# Patient Record
Sex: MALE | Race: WHITE | NOT HISPANIC OR LATINO | Employment: OTHER | URBAN - METROPOLITAN AREA
[De-identification: names, ages, dates, MRNs, and addresses within clinical notes are randomized per-mention and may not be internally consistent; named-entity substitution may affect disease eponyms.]

---

## 2019-02-06 ENCOUNTER — TELEPHONE (OUTPATIENT)
Dept: CARDIOLOGY | Facility: CLINIC | Age: 69
End: 2019-02-06

## 2019-02-06 NOTE — TELEPHONE ENCOUNTER
I called pt Re: NP OV -- 2/12/19- [facility] for him to call me back.     I faxed PCP for records.

## 2019-02-06 NOTE — TELEPHONE ENCOUNTER
Pt called me back- He states he gets SOB, chest tightness x few mos off and on. Pt did not go to ER.     Saw Jose 10/9/18 for physical- EKG was normal.    Family History-     Father heart attack- 78yo    Maternal Grandparents both had heart issues.

## 2019-02-06 NOTE — TELEPHONE ENCOUNTER
Dr. Mckeon- NP OV -- 2/12/19- [facility]    See Below.    Would you want testing with visit?    Let me know.    Thanks,    KL

## 2019-02-06 NOTE — TELEPHONE ENCOUNTER
"Information from Appt Notes- copied and pasted    \"Np self ref. for cardiac eval. Pt had ekg and labs done  w/ pcp Dr Arana/ In[facility]  P# [phone]  will fax to [id number]\"  "

## 2019-02-11 PROBLEM — I10 ESSENTIAL HYPERTENSION: Status: ACTIVE | Noted: 2019-02-11

## 2019-02-11 PROBLEM — R07.89 OTHER CHEST PAIN: Status: ACTIVE | Noted: 2019-02-11

## 2019-02-12 ENCOUNTER — OFFICE VISIT (OUTPATIENT)
Dept: CARDIOLOGY | Facility: CLINIC | Age: 69
End: 2019-02-12
Payer: MEDICARE

## 2019-02-12 ENCOUNTER — TELEPHONE (OUTPATIENT)
Dept: SCHEDULING | Facility: CLINIC | Age: 69
End: 2019-02-12

## 2019-02-12 VITALS
WEIGHT: 154 LBS | DIASTOLIC BLOOD PRESSURE: 80 MMHG | HEIGHT: 67 IN | OXYGEN SATURATION: 98 % | BODY MASS INDEX: 24.17 KG/M2 | SYSTOLIC BLOOD PRESSURE: 122 MMHG | HEART RATE: 94 BPM

## 2019-02-12 DIAGNOSIS — R07.89 OTHER CHEST PAIN: ICD-10-CM

## 2019-02-12 DIAGNOSIS — I10 ESSENTIAL HYPERTENSION: ICD-10-CM

## 2019-02-12 DIAGNOSIS — K76.0 FATTY LIVER: ICD-10-CM

## 2019-02-12 DIAGNOSIS — R07.9 CHEST PAIN, UNSPECIFIED TYPE: Primary | ICD-10-CM

## 2019-02-12 PROBLEM — Z82.49 FAMILY HISTORY OF EARLY CAD: Status: ACTIVE | Noted: 2019-02-12

## 2019-02-12 PROCEDURE — 93000 ELECTROCARDIOGRAM COMPLETE: CPT | Performed by: INTERNAL MEDICINE

## 2019-02-12 PROCEDURE — 99204 OFFICE O/P NEW MOD 45 MIN: CPT | Performed by: INTERNAL MEDICINE

## 2019-02-12 RX ORDER — NAPROXEN SODIUM 550 MG/1
550 TABLET ORAL AS NEEDED
Refills: 6 | COMMUNITY
Start: 2018-12-03 | End: 2020-08-14

## 2019-02-12 RX ORDER — AMLODIPINE BESYLATE 2.5 MG/1
2.5 TABLET ORAL DAILY
COMMUNITY

## 2019-02-12 RX ORDER — LORAZEPAM 0.5 MG/1
0.5 TABLET ORAL AS NEEDED
Refills: 0 | COMMUNITY
Start: 2019-01-23

## 2019-02-12 RX ORDER — MAGNESIUM 250 MG
250 TABLET ORAL DAILY
COMMUNITY
End: 2020-08-14

## 2019-02-12 RX ORDER — TAMSULOSIN HYDROCHLORIDE 0.4 MG/1
0.8 CAPSULE ORAL NIGHTLY
Refills: 3 | COMMUNITY
Start: 2018-12-01

## 2019-02-12 RX ORDER — OMEPRAZOLE 40 MG/1
40 CAPSULE, DELAYED RELEASE ORAL
Refills: 3 | COMMUNITY
Start: 2018-12-04

## 2019-02-12 RX ORDER — FINASTERIDE 5 MG/1
5 TABLET, FILM COATED ORAL
Refills: 4 | COMMUNITY
Start: 2018-12-04

## 2019-02-12 ASSESSMENT — ENCOUNTER SYMPTOMS
HEMATOLOGIC/LYMPHATIC NEGATIVE: 1
FOCAL WEAKNESS: 0
CLAUDICATION: 0
CONSTIPATION: 0
HOARSE VOICE: 0
SLEEP DISTURBANCES DUE TO BREATHING: 0
NUMBNESS: 0
NAUSEA: 0
HEADACHES: 0
NEAR-SYNCOPE: 0
ORTHOPNEA: 0
HEARTBURN: 0
JAUNDICE: 0
HEMOPTYSIS: 0
SPUTUM PRODUCTION: 0
WEIGHT GAIN: 0
SNORING: 0
COUGH: 0
MUSCLE CRAMPS: 0
DIZZINESS: 0
TREMORS: 0
PALPITATIONS: 0
WEIGHT LOSS: 0
DYSPNEA ON EXERTION: 0
FREQUENCY: 0
FALLS: 0
IRREGULAR HEARTBEAT: 0
WHEEZING: 0
DIARRHEA: 0
LIGHT-HEADEDNESS: 0
ABDOMINAL PAIN: 0
SYNCOPE: 0
ANOREXIA: 0
VERTIGO: 0
MEMORY LOSS: 0
NERVOUS/ANXIOUS: 0
CHANGE IN BOWEL HABIT: 0
MYALGIAS: 0
PND: 0
BRUISES/BLEEDS EASILY: 0
INSOMNIA: 0
SHORTNESS OF BREATH: 0

## 2019-02-12 NOTE — ASSESSMENT & PLAN NOTE
New onset of chest pain some is concerning for possible angina   Other aspects sound more atypical and can occur at rest   risk factors include family history and hypertension we will check cholesterol level  Plan coronary CT angiogram and stress echocardiogram  This will establish the presence or absence and degree of CAD

## 2019-02-12 NOTE — PROGRESS NOTES
Cardiology Consult/New Patient    Eliot Garcia MD          Jossue Freire is a 68 y.o. male identifies as who presents with   He is here for new onset of shortness of breath and chest pain some occurs with activity some not can last a few minutes  It is a little concerning  He gets it rarely  He said he was shoveling snow yesterday and had no issues  Risk factors include hypertension, family history he has never smoked he does not know his cholesterol level      Patient Active Problem List    Diagnosis Date Noted   • Fatty liver 02/12/2019   • Family history of early CAD 02/12/2019   • Essential hypertension 02/11/2019   • Other chest pain 02/11/2019       Medical History:   Past Medical History:   Diagnosis Date   • Arthritis    • Heart murmur    • Infectious viral hepatitis    • Pancreatitis        Surgical History:   Past Surgical History:   Procedure Laterality Date   • CHOLECYSTECTOMY     • EYE SURGERY     • SKIN BIOPSY         Allergies: Patient has no known allergies.    Current Outpatient Prescriptions   Medication Sig Dispense Refill   • amLODIPine (NORVASC) 2.5 mg tablet Take 2.5 mg by mouth daily.     • finasteride (PROSCAR) 5 mg tablet Take 5 mg by mouth once daily.  4   • folic acid/multivit-min/lutein (CENTRUM SILVER ORAL) Take by mouth.     • LORazepam (ATIVAN) 0.5 mg tablet Take 0.5 mg by mouth nightly.  0   • magnesium 250 mg tablet Take 250 mg by mouth daily. Patient takes 2  Tablets daily     • naproxen sodium (ANAPROX) 550 mg tablet Take 550 mg by mouth 2 (two) times a day.  6   • omeprazole (PriLOSEC) 40 mg capsule Take 40 mg by mouth once daily.  3   • tamsulosin (FLOMAX) 0.4 mg capsule Take 0.8 mg by mouth nightly.  3     No current facility-administered medications for this visit.        Social History:   Social History     Social History   • Marital status:      Spouse name: N/A   • Number of children: N/A   • Years of education: N/A     Social History Main Topics   • Smoking  status: Never Smoker   • Smokeless tobacco: Never Used   • Alcohol use No   • Drug use: Unknown   • Sexual activity: Defer     Other Topics Concern   • None     Social History Narrative   • None       Family History:   Family History   Problem Relation Age of Onset   • Heart attack Father    • No Known Problems Sister        Review of Systems   Review of Systems   Constitution: Negative for malaise/fatigue, weight gain and weight loss.   HENT: Negative for hearing loss and hoarse voice.    Eyes: Negative for visual disturbance.   Cardiovascular: Positive for chest pain. Negative for claudication, cyanosis, dyspnea on exertion, irregular heartbeat, leg swelling, near-syncope, orthopnea, palpitations, paroxysmal nocturnal dyspnea and syncope.   Respiratory: Negative for cough, hemoptysis, shortness of breath, sleep disturbances due to breathing, snoring, sputum production and wheezing.    Endocrine: Negative for cold intolerance and heat intolerance.   Hematologic/Lymphatic: Negative.  Negative for bleeding problem. Does not bruise/bleed easily.   Skin: Negative.  Negative for rash.   Musculoskeletal: Negative for arthritis, falls, joint pain, muscle cramps and myalgias.   Gastrointestinal: Negative for abdominal pain, anorexia, change in bowel habit, constipation, diarrhea, dysphagia, heartburn, jaundice and nausea.   Genitourinary: Negative for frequency and nocturia.   Neurological: Negative for dizziness, focal weakness, headaches, light-headedness, numbness, tremors and vertigo.   Psychiatric/Behavioral: Negative for memory loss. The patient does not have insomnia and is not nervous/anxious.    Allergic/Immunologic: Negative for hives.       Objective       Vitals:    02/12/19 1055   BP: 122/80   Pulse: 94   SpO2: 98%       Physical Exam   Constitutional: He is oriented to person, place, and time. He appears well-developed and well-nourished. He is active.  Non-toxic appearance. He does not have a sickly  appearance. He does not appear ill. No distress. He is not intubated.   HENT:   Head: Normocephalic. Not microcephalic. Head is without raccoon's eyes, without abrasion and without contusion.   Nose: Nose normal.   Eyes: EOM are normal. Left eye exhibits no discharge and no exudate. Right conjunctiva is not injected. Left conjunctiva is not injected. Left conjunctiva has no hemorrhage. No scleral icterus. Pupils are equal.   Neck: Normal range of motion. Neck supple. Normal carotid pulses and no hepatojugular reflux present. Carotid bruit is not present.   Cardiovascular: Normal rate, regular rhythm, normal heart sounds, intact distal pulses and normal pulses.  PMI is not displaced.  Exam reveals no gallop and no friction rub.    No murmur heard.  Pulmonary/Chest: Effort normal and breath sounds normal. No stridor. No apnea, no tachypnea and no bradypnea. He is not intubated. No respiratory distress. He has no wheezes. He has no rales. He exhibits no tenderness.   Abdominal: Soft. Normal appearance, normal aorta and bowel sounds are normal. He exhibits no distension. There is no tenderness.   Musculoskeletal: Normal range of motion. He exhibits no edema or deformity.   Neurological: He is alert and oriented to person, place, and time.   Skin: No abrasion, no bruising, no ecchymosis and no rash noted. He is not diaphoretic. No erythema. No pallor.   Psychiatric: He has a normal mood and affect. His mood appears not anxious. His affect is not angry, not blunt, not labile and not inappropriate. His speech is not slurred. He is not agitated, not aggressive, not withdrawn and not combative. He does not exhibit a depressed mood. He is communicative.        Labs   No results found for: WBC, HGB, HCT, PLT, CHOL, TRIG, HDL, LDLDIRECT, ALT, AST, NA, K, CL, CREATININE, BUN, CO2, TSH, PSA, INR, HGBA1C, MICROALBUR    Imaging      ECG nsr irbbb        Assessment/Plan     Other chest pain  New onset of chest pain some is  concerning for possible angina   Other aspects sound more atypical and can occur at rest   risk factors include family history and hypertension we will check cholesterol level  Plan coronary CT angiogram and stress echocardiogram  This will establish the presence or absence and degree of CAD    Essential hypertension  Controlled with amlodipine       Plan coronary CT angiogram and stress echo to establish presence or absence of CAD certainly if there is any arterial sclerosis would begin statin therapy to push LDL cholesterol under 70  He will get lab work prior to the angiogram  And I will see him at time of stress testing    Knows of any increase in frequency or prolonged episode to go to the emergency room  This letter was generated using speech recognition software.  Please excuse any typographical errors.    Liam Mckeon MD  2/12/2019

## 2019-02-12 NOTE — LETTER
February 12, 2019     Eliot Garcia MD  [address]   [address]  [place] [address]    Patient: Jossue Freire   YOB: 1950   Date of Visit: 2/12/2019       Dear Ricardo    Thank you for referring Jossue Freire to me for evaluation. Below are my notes for this consultation.    If you have questions, please do not hesitate to call me. I look forward to following your patient along with you.         Sincerely,        Liam Mckeon MD        CC: No Recipients  Liam Mckeon MD  2/12/2019 11:18 AM  Sign at close encounter  Cardiology Consult/New Patient    Eliot Garcia MD          Jossue Freire is a 68 y.o. male identifies as who presents with   He is here for new onset of shortness of breath and chest pain some occurs with activity some not can last a few minutes  It is a little concerning  He gets it rarely  He said he was shoveling snow yesterday and had no issues  Risk factors include hypertension, family history he has never smoked he does not know his cholesterol level      Patient Active Problem List    Diagnosis Date Noted   • Fatty liver 02/12/2019   • Family history of early CAD 02/12/2019   • Essential hypertension 02/11/2019   • Other chest pain 02/11/2019       Medical History:   Past Medical History:   Diagnosis Date   • Arthritis    • Heart murmur    • Infectious viral hepatitis    • Pancreatitis        Surgical History:   Past Surgical History:   Procedure Laterality Date   • CHOLECYSTECTOMY     • EYE SURGERY     • SKIN BIOPSY         Allergies: Patient has no known allergies.    Current Outpatient Prescriptions   Medication Sig Dispense Refill   • amLODIPine (NORVASC) 2.5 mg tablet Take 2.5 mg by mouth daily.     • finasteride (PROSCAR) 5 mg tablet Take 5 mg by mouth once daily.  4   • folic acid/multivit-min/lutein (CENTRUM SILVER ORAL) Take by mouth.     • LORazepam (ATIVAN) 0.5 mg tablet Take 0.5 mg by mouth nightly.  0   • magnesium 250 mg tablet Take 250  mg by mouth daily. Patient takes 2  Tablets daily     • naproxen sodium (ANAPROX) 550 mg tablet Take 550 mg by mouth 2 (two) times a day.  6   • omeprazole (PriLOSEC) 40 mg capsule Take 40 mg by mouth once daily.  3   • tamsulosin (FLOMAX) 0.4 mg capsule Take 0.8 mg by mouth nightly.  3     No current facility-administered medications for this visit.        Social History:   Social History     Social History   • Marital status:      Spouse name: N/A   • Number of children: N/A   • Years of education: N/A     Social History Main Topics   • Smoking status: Never Smoker   • Smokeless tobacco: Never Used   • Alcohol use No   • Drug use: Unknown   • Sexual activity: Defer     Other Topics Concern   • None     Social History Narrative   • None       Family History:   Family History   Problem Relation Age of Onset   • Heart attack Father    • No Known Problems Sister        Review of Systems   Review of Systems   Constitution: Negative for malaise/fatigue, weight gain and weight loss.   HENT: Negative for hearing loss and hoarse voice.    Eyes: Negative for visual disturbance.   Cardiovascular: Positive for chest pain. Negative for claudication, cyanosis, dyspnea on exertion, irregular heartbeat, leg swelling, near-syncope, orthopnea, palpitations, paroxysmal nocturnal dyspnea and syncope.   Respiratory: Negative for cough, hemoptysis, shortness of breath, sleep disturbances due to breathing, snoring, sputum production and wheezing.    Endocrine: Negative for cold intolerance and heat intolerance.   Hematologic/Lymphatic: Negative.  Negative for bleeding problem. Does not bruise/bleed easily.   Skin: Negative.  Negative for rash.   Musculoskeletal: Negative for arthritis, falls, joint pain, muscle cramps and myalgias.   Gastrointestinal: Negative for abdominal pain, anorexia, change in bowel habit, constipation, diarrhea, dysphagia, heartburn, jaundice and nausea.   Genitourinary: Negative for frequency and  nocturia.   Neurological: Negative for dizziness, focal weakness, headaches, light-headedness, numbness, tremors and vertigo.   Psychiatric/Behavioral: Negative for memory loss. The patient does not have insomnia and is not nervous/anxious.    Allergic/Immunologic: Negative for hives.       Objective       Vitals:    02/12/19 1055   BP: 122/80   Pulse: 94   SpO2: 98%       Physical Exam   Constitutional: He is oriented to person, place, and time. He appears well-developed and well-nourished. He is active.  Non-toxic appearance. He does not have a sickly appearance. He does not appear ill. No distress. He is not intubated.   HENT:   Head: Normocephalic. Not microcephalic. Head is without raccoon's eyes, without abrasion and without contusion.   Nose: Nose normal.   Eyes: EOM are normal. Left eye exhibits no discharge and no exudate. Right conjunctiva is not injected. Left conjunctiva is not injected. Left conjunctiva has no hemorrhage. No scleral icterus. Pupils are equal.   Neck: Normal range of motion. Neck supple. Normal carotid pulses and no hepatojugular reflux present. Carotid bruit is not present.   Cardiovascular: Normal rate, regular rhythm, normal heart sounds, intact distal pulses and normal pulses.  PMI is not displaced.  Exam reveals no gallop and no friction rub.    No murmur heard.  Pulmonary/Chest: Effort normal and breath sounds normal. No stridor. No apnea, no tachypnea and no bradypnea. He is not intubated. No respiratory distress. He has no wheezes. He has no rales. He exhibits no tenderness.   Abdominal: Soft. Normal appearance, normal aorta and bowel sounds are normal. He exhibits no distension. There is no tenderness.   Musculoskeletal: Normal range of motion. He exhibits no edema or deformity.   Neurological: He is alert and oriented to person, place, and time.   Skin: No abrasion, no bruising, no ecchymosis and no rash noted. He is not diaphoretic. No erythema. No pallor.   Psychiatric: He  has a normal mood and affect. His mood appears not anxious. His affect is not angry, not blunt, not labile and not inappropriate. His speech is not slurred. He is not agitated, not aggressive, not withdrawn and not combative. He does not exhibit a depressed mood. He is communicative.        Labs   No results found for: WBC, HGB, HCT, PLT, CHOL, TRIG, HDL, LDLDIRECT, ALT, AST, NA, K, CL, CREATININE, BUN, CO2, TSH, PSA, INR, HGBA1C, MICROALBUR    Imaging      ECG nsr irbbb        Assessment/Plan     Other chest pain  New onset of chest pain some is concerning for possible angina   Other aspects sound more atypical and can occur at rest   risk factors include family history and hypertension we will check cholesterol level  Plan coronary CT angiogram and stress echocardiogram  This will establish the presence or absence and degree of CAD    Essential hypertension  Controlled with amlodipine       Plan coronary CT angiogram and stress echo to establish presence or absence of CAD certainly if there is any arterial sclerosis would begin statin therapy to push LDL cholesterol under 70  He will get lab work prior to the angiogram  And I will see him at time of stress testing      This letter was generated using speech recognition software.  Please excuse any typographical errors.    Liam Mckeon MD  2/12/2019

## 2019-02-12 NOTE — TELEPHONE ENCOUNTER
Dr Mckeon pat. He scheduled CT angiogram 2/27.  Patient states he will need another study after this one. Please call patient at [phone] to schedule.

## 2019-02-25 NOTE — PROGRESS NOTES
Cardiology Note    Eliot Garcia MD          Jossue Freire is a 68 y.o. male 1950     He returns for follow-up visit and stress echocardiogram\  He has nonobstructive CAD based on recent coronary CT angiogram 30-40% lesions and coronary calcium score of 38  He has episodes of chest pain some sounds like angina but some occurs at rest and sounds more atypical  Risk factors include family history hypertension and hyperlipidemia  Stress echocardiogram today negative for ischemia                 Patient Active Problem List    Diagnosis Date Noted   • Coronary artery disease involving native coronary artery of native heart without angina pectoris 03/01/2019   • Fatty liver 02/12/2019   • Family history of early CAD 02/12/2019   • Essential hypertension 02/11/2019   • Other chest pain 02/11/2019       Allergy  Patient has no known allergies.    MED LIST     Current Outpatient Prescriptions   Medication Sig Dispense Refill   • amLODIPine (NORVASC) 2.5 mg tablet Take 2.5 mg by mouth daily.     • diazePAM (VALIUM) 2 mg tablet Take 2 mg by mouth nightly.     • finasteride (PROSCAR) 5 mg tablet Take 5 mg by mouth once daily.  4   • folic acid/multivit-min/lutein (CENTRUM SILVER ORAL) Take by mouth.     • LORazepam (ATIVAN) 0.5 mg tablet Take 0.5 mg by mouth as needed.    0   • magnesium 250 mg tablet Take 250 mg by mouth daily. Patient takes 2  Tablets daily     • naproxen sodium (ANAPROX) 550 mg tablet Take 550 mg by mouth as needed.    6   • omeprazole (PriLOSEC) 40 mg capsule Take 40 mg by mouth once daily.  3   • tamsulosin (FLOMAX) 0.4 mg capsule Take 0.8 mg by mouth nightly.  3   • aspirin (ASPIR-81) 81 mg enteric coated tablet Take 1 tablet (81 mg total) by mouth daily. 90 tablet 1   • rosuvastatin (CRESTOR) 20 mg tablet Take 1 tablet (20 mg total) by mouth daily. 90 tablet 1     No current facility-administered medications for this visit.         Review of Systems   Constitution: Negative for  "malaise/fatigue, weight gain and weight loss.   HENT: Negative for hearing loss and hoarse voice.    Eyes: Negative for visual disturbance.   Cardiovascular: Negative for chest pain, claudication, cyanosis, dyspnea on exertion, irregular heartbeat, leg swelling, near-syncope, orthopnea, palpitations, paroxysmal nocturnal dyspnea and syncope.   Respiratory: Negative for cough, hemoptysis, shortness of breath, sleep disturbances due to breathing, snoring, sputum production and wheezing.    Endocrine: Negative for cold intolerance and heat intolerance.   Hematologic/Lymphatic: Negative.  Negative for bleeding problem. Does not bruise/bleed easily.   Skin: Negative.  Negative for rash.   Musculoskeletal: Negative for arthritis, falls, joint pain, muscle cramps and myalgias.   Gastrointestinal: Negative for abdominal pain, anorexia, change in bowel habit, constipation, diarrhea, dysphagia, heartburn, jaundice and nausea.   Genitourinary: Negative for frequency and nocturia.   Neurological: Negative for dizziness, focal weakness, headaches, light-headedness, numbness, tremors and vertigo.   Psychiatric/Behavioral: Negative for memory loss. The patient does not have insomnia and is not nervous/anxious.    Allergic/Immunologic: Negative for hives.       Labs                 No results found for: WBC, HGB, HCT, PLT, CHOL, TRIG, HDL, LDLDIRECT, TOTLDLC, LDLCALC, ALT, AST, NA, K, CL, CREATININE, BUN, CO2, TSH, INR, HGBA1C, BNP    No results found for: GLUCOSE, CALCIUM, NA, K, CO2, CL, BUN, CREATININE    Objective   Vitals:    03/04/19 1522   BP: (!) 156/80   BP Location: Right upper arm   Patient Position: Standing   Pulse: 93   SpO2: 98%   Weight: 71.2 kg (157 lb)   Height: 1.702 m (5' 7\")     Physical Exam   Constitutional: He is oriented to person, place, and time. He appears well-developed and well-nourished. He is active.  Non-toxic appearance. He does not have a sickly appearance. He does not appear ill. No distress. He " is not intubated.   HENT:   Head: Normocephalic. Not microcephalic. Head is without raccoon's eyes, without abrasion and without contusion.   Nose: Nose normal.   Eyes: EOM are normal. Left eye exhibits no discharge and no exudate. Right conjunctiva is not injected. Left conjunctiva is not injected. Left conjunctiva has no hemorrhage. No scleral icterus. Pupils are equal.   Neck: Normal range of motion. Neck supple. Normal carotid pulses and no hepatojugular reflux present. Carotid bruit is not present.   Cardiovascular: Normal rate, regular rhythm, normal heart sounds, intact distal pulses and normal pulses.  PMI is not displaced.  Exam reveals no gallop and no friction rub.    No murmur heard.  Pulmonary/Chest: Effort normal and breath sounds normal. No stridor. No apnea, no tachypnea and no bradypnea. He is not intubated. No respiratory distress. He has no wheezes. He has no rales. He exhibits no tenderness.   Abdominal: Soft. Normal appearance, normal aorta and bowel sounds are normal. He exhibits no distension. There is no tenderness.   Musculoskeletal: Normal range of motion. He exhibits no edema or deformity.   Neurological: He is alert and oriented to person, place, and time.   Skin: No abrasion, no bruising, no ecchymosis and no rash noted. He is not diaphoretic. No erythema. No pallor.   Psychiatric: He has a normal mood and affect. His mood appears not anxious. His affect is not angry, not blunt, not labile and not inappropriate. His speech is not slurred. He is not agitated, not aggressive, not withdrawn and not combative. He does not exhibit a depressed mood. He is communicative.       Cardiac Procedures    STRESS TEST  Stress echo neg 2/19    CAT SCAN  COR CTA SCORE 38 LAD [id number]% MID MID RCA 30%   FATTY LIVER     SUMMARY:COR CTA 2/19  1. Nonobstructive two-vessel coronary artery disease. There is focal calcified  plaque involving the proximal and mid LAD with less than 30% stenosis. There is  focal  calcified plaque in the mid RCA with less than 30% stenosis.  2.  Normal cardiac structures.  3.  Normal left ventricular systolic function. Left ventricular ejection  fraction 50%.  4.  Small hiatal hernia.  5.  Coronary calcium score 38.  This places the patient in the GREEN 80th risk  percentile for age and gender matched individuals  EKG    Assessment/Plan:  Coronary artery disease involving native coronary artery of native heart without angina pectoris  He has nonobstructive CAD based on coronary CT angiogram performed February 2019 30% lesions calcium score of 38 stress echo today negative for ischemia  Baseline EKG is fluctuating inferior T wave inversions  For secondary prevention will begin statin for his LDL cholesterol 90 many people believe with mild plaquing/7 LDL 50 if possible and he is on aspirin 81 mg recheck lab work in a few months    Essential hypertension  Physiologic response to stress testing daily continue amlodipine       He has a diagnosis of diagnosis of nonobstructive CAD made by coronary CT angiogram and stress echocardiogram  Family history of premature cardiac disease  Will begin statin therapy antiplatelet therapy  Blood pressure is well controlled on amlodipine follow-up with lab work in 3      Thank you for allowing me to participate in the care of this patient.  I hope this information is helpful.    Liam Mckeon MD [facility]   3/4/2019  Cc Eliot Garcia MD

## 2019-02-27 ENCOUNTER — HOSPITAL ENCOUNTER (OUTPATIENT)
Dept: RADIOLOGY | Facility: HOSPITAL | Age: 69
Discharge: HOME | End: 2019-02-27
Attending: INTERNAL MEDICINE
Payer: MEDICARE

## 2019-02-27 VITALS
BODY MASS INDEX: 24.17 KG/M2 | WEIGHT: 154 LBS | DIASTOLIC BLOOD PRESSURE: 71 MMHG | HEIGHT: 67 IN | OXYGEN SATURATION: 99 % | HEART RATE: 62 BPM | SYSTOLIC BLOOD PRESSURE: 129 MMHG

## 2019-02-27 DIAGNOSIS — R07.9 CHEST PAIN, UNSPECIFIED TYPE: ICD-10-CM

## 2019-02-27 PROCEDURE — 75574 CT ANGIO HRT W/3D IMAGE: CPT

## 2019-02-27 PROCEDURE — 63700000 HC SELF-ADMINISTRABLE DRUG: Performed by: INTERNAL MEDICINE

## 2019-02-27 PROCEDURE — 63600105 HC IODINE BASED CONTRAST: Mod: JW | Performed by: INTERNAL MEDICINE

## 2019-02-27 PROCEDURE — 25000000 HC PHARMACY GENERAL: Performed by: INTERNAL MEDICINE

## 2019-02-27 RX ORDER — METOPROLOL TARTRATE 1 MG/ML
5 INJECTION, SOLUTION INTRAVENOUS AS NEEDED
Status: COMPLETED | OUTPATIENT
Start: 2019-02-27 | End: 2019-02-27

## 2019-02-27 RX ORDER — NITROGLYCERIN 0.4 MG/1
0.4 TABLET SUBLINGUAL ONCE
Status: COMPLETED | OUTPATIENT
Start: 2019-02-27 | End: 2019-02-27

## 2019-02-27 RX ORDER — METOPROLOL TARTRATE 50 MG/1
150 TABLET ORAL ONCE
Status: COMPLETED | OUTPATIENT
Start: 2019-02-27 | End: 2019-02-27

## 2019-02-27 RX ORDER — METOPROLOL TARTRATE 50 MG/1
50 TABLET ORAL ONCE
Status: COMPLETED | OUTPATIENT
Start: 2019-02-27 | End: 2019-02-27

## 2019-02-27 RX ADMIN — METOPROLOL TARTRATE 5 MG: 5 INJECTION, SOLUTION INTRAVENOUS at 11:29

## 2019-02-27 RX ADMIN — METOPROLOL TARTRATE 5 MG: 5 INJECTION, SOLUTION INTRAVENOUS at 11:05

## 2019-02-27 RX ADMIN — METOPROLOL TARTRATE 150 MG: 50 TABLET, FILM COATED ORAL at 10:03

## 2019-02-27 RX ADMIN — NITROGLYCERIN 0.4 MG: 0.4 TABLET SUBLINGUAL at 11:49

## 2019-02-27 RX ADMIN — METOPROLOL TARTRATE 50 MG: 50 TABLET, FILM COATED ORAL at 10:26

## 2019-02-27 RX ADMIN — IOHEXOL 95 ML: 350 INJECTION, SOLUTION INTRAVENOUS at 12:17

## 2019-02-27 RX ADMIN — METOPROLOL TARTRATE 5 MG: 5 INJECTION, SOLUTION INTRAVENOUS at 11:24

## 2019-02-27 RX ADMIN — METOPROLOL TARTRATE 5 MG: 5 INJECTION, SOLUTION INTRAVENOUS at 11:10

## 2019-02-27 RX ADMIN — METOPROLOL TARTRATE 5 MG: 5 INJECTION, SOLUTION INTRAVENOUS at 11:35

## 2019-02-27 RX ADMIN — METOPROLOL TARTRATE 5 MG: 5 INJECTION, SOLUTION INTRAVENOUS at 11:00

## 2019-02-28 ENCOUNTER — TELEPHONE (OUTPATIENT)
Dept: CARDIOLOGY | Facility: CLINIC | Age: 69
End: 2019-02-28

## 2019-03-01 PROBLEM — I25.10 CORONARY ARTERY DISEASE INVOLVING NATIVE CORONARY ARTERY OF NATIVE HEART WITHOUT ANGINA PECTORIS: Status: ACTIVE | Noted: 2019-03-01

## 2019-03-04 ENCOUNTER — HOSPITAL ENCOUNTER (OUTPATIENT)
Dept: CARDIOLOGY | Facility: CLINIC | Age: 69
Discharge: HOME | End: 2019-03-04
Payer: MEDICARE

## 2019-03-04 ENCOUNTER — OFFICE VISIT (OUTPATIENT)
Dept: CARDIOLOGY | Facility: CLINIC | Age: 69
End: 2019-03-04
Payer: MEDICARE

## 2019-03-04 VITALS — HEIGHT: 67 IN | WEIGHT: 154 LBS | BODY MASS INDEX: 24.17 KG/M2

## 2019-03-04 VITALS
WEIGHT: 157 LBS | HEIGHT: 67 IN | BODY MASS INDEX: 24.64 KG/M2 | OXYGEN SATURATION: 98 % | HEART RATE: 93 BPM | DIASTOLIC BLOOD PRESSURE: 80 MMHG | SYSTOLIC BLOOD PRESSURE: 156 MMHG

## 2019-03-04 DIAGNOSIS — I10 ESSENTIAL HYPERTENSION: ICD-10-CM

## 2019-03-04 DIAGNOSIS — K76.0 FATTY LIVER: ICD-10-CM

## 2019-03-04 DIAGNOSIS — R07.89 OTHER CHEST PAIN: ICD-10-CM

## 2019-03-04 DIAGNOSIS — R07.89 OTHER CHEST PAIN: Primary | ICD-10-CM

## 2019-03-04 DIAGNOSIS — Z82.49 FAMILY HISTORY OF EARLY CAD: ICD-10-CM

## 2019-03-04 DIAGNOSIS — R07.9 CHEST PAIN, UNSPECIFIED TYPE: ICD-10-CM

## 2019-03-04 LAB
AORTIC ROOT ANNULUS: 3.5 CM
ASCENDING AORTA: 3.3 CM
AV PEAK GRADIENT: 5 MMHG
AV PEAK VELOCITY-S: 1.17 M/S
AV VALVE AREA: 2.58 CM2
BSA FOR ECHO PROCEDURE: 1.82 M2
E WAVE DECELERATION TIME: 197 MS
E/A RATIO: 0.7
E/E' RATIO: 10.6
E/LAT E' RATIO: 8.6
EDV (BP): 70.4 CM3
EF (A4C): 65.1 %
EF A2C: 57.3 %
EJECTION FRACTION: 60.4 %
ESV (BP): 27.9 CM3
LA ESV (BP): 45.4 CM3
LA ESV INDEX (A2C): 26.48 CM3/M2
LA ESV INDEX (BP): 24.95 CM3/M2
LA/AORTA RATIO: 1.2
LAAS-AP2: 16.4 CM2
LAAS-AP4: 15.7 CM2
LAD 2D: 4.2 CM
LALD A4C: 4.53 CM
LALD A4C: 4.9 CM
LAV-S: 48.2 CM3
LEFT ATRIUM VOLUME INDEX: 24.73 CM3/M2
LEFT ATRIUM VOLUME: 45 CM3
LEFT VENTRICLE DIASTOLIC VOLUME INDEX: 42.2 CM3/M2
LEFT VENTRICLE DIASTOLIC VOLUME: 76.8 CM3
LEFT VENTRICLE SYSTOLIC VOLUME INDEX: 14.73 CM3/M2
LEFT VENTRICLE SYSTOLIC VOLUME: 26.8 CM3
LV DIASTOLIC VOLUME: 63.2 CM3
LV ESV (APICAL 2 CHAMBER): 26.9 CM3
LVAD-AP2: 24.3 CM2
LVAD-AP4: 26.8 CM2
LVAS-AP2: 14.4 CM2
LVAS-AP4: 13.4 CM2
LVEDVI(A2C): 34.73 CM3/M2
LVEDVI(BP): 38.68 CM3/M2
LVESVI(A2C): 14.78 CM3/M2
LVESVI(BP): 15.33 CM3/M2
LVLD-AP2: 7.9 CM
LVLD-AP4: 7.66 CM
LVLS-AP2: 6.63 CM
LVLS-AP4: 6.12 CM
LVOT 2D: 2 CM
LVOT A: 3.14 CM2
LVOT PEAK VELOCITY: 0.96 M/S
MV E'TISSUE VEL-LAT: 0.07 M/S
MV E'TISSUE VEL-MED: 0.06 M/S
MV PEAK A VEL: 0.88 M/S
MV PEAK E VEL: 0.59 M/S
PV PEAK GRADIENT: 4 MMHG
PV PV: 0.94 M/S
RVOT VMAX: 0.87 M/S
STRESS ANGINA INDEX: 0
STRESS BASELINE BP: NORMAL MMHG
STRESS BASELINE HR: 95 BPM
STRESS O2 SAT REST: 98 %
STRESS PERCENT HR: 91 %
STRESS POST ESTIMATED WORKLOAD: 10 METS
STRESS POST EXERCISE DUR MIN: 6 MIN
STRESS POST EXERCISE DUR SEC: 10 SEC
STRESS POST PEAK BP: NORMAL MMHG
STRESS POST PEAK HR: 139 BPM
STRESS TARGET HR: 130 BPM
TR MAX PG: 22 MMHG
TRICUSPID VALVE PEAK REGURGITATION VELOCITY: 2.35 M/S
TV REST PULMONARY ARTERY PRESSURE: 27 MMHG

## 2019-03-04 PROCEDURE — 93350 STRESS TTE ONLY: CPT | Performed by: INTERNAL MEDICINE

## 2019-03-04 PROCEDURE — 99214 OFFICE O/P EST MOD 30 MIN: CPT | Performed by: INTERNAL MEDICINE

## 2019-03-04 RX ORDER — ROSUVASTATIN CALCIUM 20 MG/1
20 TABLET, COATED ORAL DAILY
Qty: 90 TABLET | Refills: 1 | Status: SHIPPED | OUTPATIENT
Start: 2019-03-04 | End: 2019-08-29 | Stop reason: SDUPTHER

## 2019-03-04 RX ORDER — DIAZEPAM 2 MG/1
2 TABLET ORAL NIGHTLY
COMMUNITY
End: 2021-05-11

## 2019-03-04 RX ORDER — ASPIRIN 81 MG/1
81 TABLET ORAL DAILY
Qty: 90 TABLET | Refills: 1 | Status: SHIPPED | OUTPATIENT
Start: 2019-03-04 | End: 2019-08-29 | Stop reason: SDUPTHER

## 2019-03-04 ASSESSMENT — ENCOUNTER SYMPTOMS
CLAUDICATION: 0
FOCAL WEAKNESS: 0
MUSCLE CRAMPS: 0
NUMBNESS: 0
SYNCOPE: 0
HEARTBURN: 0
IRREGULAR HEARTBEAT: 0
NERVOUS/ANXIOUS: 0
ORTHOPNEA: 0
INSOMNIA: 0
DIARRHEA: 0
ABDOMINAL PAIN: 0
MEMORY LOSS: 0
LIGHT-HEADEDNESS: 0
SNORING: 0
WHEEZING: 0
JAUNDICE: 0
WEIGHT GAIN: 0
SHORTNESS OF BREATH: 0
DYSPNEA ON EXERTION: 0
TREMORS: 0
VERTIGO: 0
DIZZINESS: 0
HOARSE VOICE: 0
PND: 0
NEAR-SYNCOPE: 0
WEIGHT LOSS: 0
NAUSEA: 0
CONSTIPATION: 0
CHANGE IN BOWEL HABIT: 0
MYALGIAS: 0
FALLS: 0
COUGH: 0
HEADACHES: 0
BRUISES/BLEEDS EASILY: 0
SPUTUM PRODUCTION: 0
SLEEP DISTURBANCES DUE TO BREATHING: 0
HEMATOLOGIC/LYMPHATIC NEGATIVE: 1
FREQUENCY: 0
PALPITATIONS: 0
ANOREXIA: 0
HEMOPTYSIS: 0

## 2019-03-04 NOTE — ASSESSMENT & PLAN NOTE
He has nonobstructive CAD based on coronary CT angiogram performed February 2019 30% lesions calcium score of 38 stress echo today negative for ischemia  Baseline EKG is fluctuating inferior T wave inversions  For secondary prevention will begin statin for his LDL cholesterol 90 many people believe with mild plaquing/7 LDL 50 if possible and he is on aspirin 81 mg recheck lab work in a few months

## 2019-03-04 NOTE — LETTER
March 4, 2019     Eliot Garcia MD  [address]   [address]  [facility] [address]    Patient: Jossue Freire   YOB: 1950   Date of Visit: 3/4/2019       Dear Ricardo:    Thank you for referring Jossue Freire to me for evaluation. Below are my notes for this consultation.    If you have questions, please do not hesitate to call me. I look forward to following your patient along with you.         Sincerely,        Liam Mckeon MD        CC: No Recipients  Liam Mckeon MD  3/4/2019  3:34 PM  Sign at close encounter      Cardiology Note    Eliot Garcia MD          Jossue Freire is a 68 y.o. male 1950     He returns for follow-up visit and stress echocardiogram\has nonobstructive CAD based on recent coronary CT angiogram 30-40% lesions and coronary calcium score of 38  He has episodes of chest pain some sounds like angina but some occurs at rest and sounds more atypical  Risk factors include family history hypertension and hyperlipidemia  Stress echocardiogram today negative for ischemia                 Patient Active Problem List    Diagnosis Date Noted   • Coronary artery disease involving native coronary artery of native heart without angina pectoris 03/01/2019   • Fatty liver 02/12/2019   • Family history of early CAD 02/12/2019   • Essential hypertension 02/11/2019   • Other chest pain 02/11/2019       Allergy  Patient has no known allergies.    MED LIST     Current Outpatient Prescriptions   Medication Sig Dispense Refill   • amLODIPine (NORVASC) 2.5 mg tablet Take 2.5 mg by mouth daily.     • diazePAM (VALIUM) 2 mg tablet Take 2 mg by mouth nightly.     • finasteride (PROSCAR) 5 mg tablet Take 5 mg by mouth once daily.  4   • folic acid/multivit-min/lutein (CENTRUM SILVER ORAL) Take by mouth.     • LORazepam (ATIVAN) 0.5 mg tablet Take 0.5 mg by mouth as needed.    0   • magnesium 250 mg tablet Take 250 mg by mouth daily. Patient takes 2  Tablets daily     •  naproxen sodium (ANAPROX) 550 mg tablet Take 550 mg by mouth as needed.    6   • omeprazole (PriLOSEC) 40 mg capsule Take 40 mg by mouth once daily.  3   • tamsulosin (FLOMAX) 0.4 mg capsule Take 0.8 mg by mouth nightly.  3   • aspirin (ASPIR-81) 81 mg enteric coated tablet Take 1 tablet (81 mg total) by mouth daily. 90 tablet 1   • rosuvastatin (CRESTOR) 20 mg tablet Take 1 tablet (20 mg total) by mouth daily. 90 tablet 1     No current facility-administered medications for this visit.         Review of Systems   Constitution: Negative for malaise/fatigue, weight gain and weight loss.   HENT: Negative for hearing loss and hoarse voice.    Eyes: Negative for visual disturbance.   Cardiovascular: Negative for chest pain, claudication, cyanosis, dyspnea on exertion, irregular heartbeat, leg swelling, near-syncope, orthopnea, palpitations, paroxysmal nocturnal dyspnea and syncope.   Respiratory: Negative for cough, hemoptysis, shortness of breath, sleep disturbances due to breathing, snoring, sputum production and wheezing.    Endocrine: Negative for cold intolerance and heat intolerance.   Hematologic/Lymphatic: Negative.  Negative for bleeding problem. Does not bruise/bleed easily.   Skin: Negative.  Negative for rash.   Musculoskeletal: Negative for arthritis, falls, joint pain, muscle cramps and myalgias.   Gastrointestinal: Negative for abdominal pain, anorexia, change in bowel habit, constipation, diarrhea, dysphagia, heartburn, jaundice and nausea.   Genitourinary: Negative for frequency and nocturia.   Neurological: Negative for dizziness, focal weakness, headaches, light-headedness, numbness, tremors and vertigo.   Psychiatric/Behavioral: Negative for memory loss. The patient does not have insomnia and is not nervous/anxious.    Allergic/Immunologic: Negative for hives.       Labs                 No results found for: WBC, HGB, HCT, PLT, CHOL, TRIG, HDL, LDLDIRECT, TOTLDLC, LDLCALC, ALT, AST, NA, K, CL,  "CREATININE, BUN, CO2, TSH, INR, HGBA1C, BNP    No results found for: GLUCOSE, CALCIUM, NA, K, CO2, CL, BUN, CREATININE    Objective   Vitals:    03/04/19 1522   BP: (!) 156/80   BP Location: Right upper arm   Patient Position: Standing   Pulse: 93   SpO2: 98%   Weight: 71.2 kg (157 lb)   Height: 1.702 m (5' 7\")     Physical Exam   Constitutional: He is oriented to person, place, and time. He appears well-developed and well-nourished. He is active.  Non-toxic appearance. He does not have a sickly appearance. He does not appear ill. No distress. He is not intubated.   HENT:   Head: Normocephalic. Not microcephalic. Head is without raccoon's eyes, without abrasion and without contusion.   Nose: Nose normal.   Eyes: EOM are normal. Left eye exhibits no discharge and no exudate. Right conjunctiva is not injected. Left conjunctiva is not injected. Left conjunctiva has no hemorrhage. No scleral icterus. Pupils are equal.   Neck: Normal range of motion. Neck supple. Normal carotid pulses and no hepatojugular reflux present. Carotid bruit is not present.   Cardiovascular: Normal rate, regular rhythm, normal heart sounds, intact distal pulses and normal pulses.  PMI is not displaced.  Exam reveals no gallop and no friction rub.    No murmur heard.  Pulmonary/Chest: Effort normal and breath sounds normal. No stridor. No apnea, no tachypnea and no bradypnea. He is not intubated. No respiratory distress. He has no wheezes. He has no rales. He exhibits no tenderness.   Abdominal: Soft. Normal appearance, normal aorta and bowel sounds are normal. He exhibits no distension. There is no tenderness.   Musculoskeletal: Normal range of motion. He exhibits no edema or deformity.   Neurological: He is alert and oriented to person, place, and time.   Skin: No abrasion, no bruising, no ecchymosis and no rash noted. He is not diaphoretic. No erythema. No pallor.   Psychiatric: He has a normal mood and affect. His mood appears not anxious. " His affect is not angry, not blunt, not labile and not inappropriate. His speech is not slurred. He is not agitated, not aggressive, not withdrawn and not combative. He does not exhibit a depressed mood. He is communicative.       Cardiac Procedures    STRESS TEST  Stress echo neg 2/19    CAT SCAN  COR CTA SCORE 38 LAD [id number]% MID MID RCA 30%   FATTY LIVER     SUMMARY:COR CTA 2/19  1. Nonobstructive two-vessel coronary artery disease. There is focal calcified  plaque involving the proximal and mid LAD with less than 30% stenosis. There is  focal calcified plaque in the mid RCA with less than 30% stenosis.  2.  Normal cardiac structures.  3.  Normal left ventricular systolic function. Left ventricular ejection  fraction 50%.  4.  Small hiatal hernia.  5.  Coronary calcium score 38.  This places the patient in the GREEN 80th risk  percentile for age and gender matched individuals  EKG    Assessment/Plan:  Coronary artery disease involving native coronary artery of native heart without angina pectoris  He has nonobstructive CAD based on coronary CT angiogram performed February 2019 30% lesions calcium score of 38 stress echo today negative for ischemia  Baseline EKG is fluctuating inferior T wave inversions  For secondary prevention will begin statin for his LDL cholesterol 90 many people believe with mild plaquing/7 LDL 50 if possible and he is on aspirin 81 mg recheck lab work in a few months    Essential hypertension  Physiologic response to stress testing daily continue amlodipine       He has a diagnosis of diagnosis of nonobstructive CAD made by coronary CT angiogram and stress echocardiogram  Family history of premature cardiac disease  Will begin statin therapy antiplatelet therapy  Blood pressure is well controlled on amlodipine follow-up with lab work in 3      Thank you for allowing me to participate in the care of this patient.  I hope this information is helpful.    Liam Mckeon MD [facility]    3/4/2019  Cc Eliot Garcia MD

## 2019-03-14 ENCOUNTER — TELEPHONE (OUTPATIENT)
Dept: CARDIOLOGY | Facility: CLINIC | Age: 69
End: 2019-03-14

## 2019-03-14 NOTE — TELEPHONE ENCOUNTER
Bhakti Green stopped by [facility]- He states that he rec'd a bill for over $300. He called Medicare, he was told Medicare still had his old insurance on file. He was told to have the office re-bill claims.    Thanks,    KL

## 2019-03-14 NOTE — TELEPHONE ENCOUNTER
Pt returning Christian's call.   I did relay mssg that it was being resubmitted to Medicare.   P: [phone]

## 2019-03-14 NOTE — TELEPHONE ENCOUNTER
LMOM for pt to call me back.  I spoke to the billing office, and they are going to resubmit to Medicare DOS 02-12-19 and 03-.

## 2019-06-05 ENCOUNTER — TELEPHONE (OUTPATIENT)
Dept: CARDIOLOGY | Facility: CLINIC | Age: 69
End: 2019-06-05

## 2019-06-05 NOTE — TELEPHONE ENCOUNTER
I called pt to confirm OV -- 6/11/19- LMOM for him to call me back.    Did he have labs done, where?

## 2019-06-07 PROBLEM — E78.2 MIXED HYPERLIPIDEMIA: Status: ACTIVE | Noted: 2019-06-07

## 2019-06-07 ASSESSMENT — ENCOUNTER SYMPTOMS
NAUSEA: 0
LIGHT-HEADEDNESS: 0
SNORING: 0
SYNCOPE: 0
SHORTNESS OF BREATH: 0
FOCAL WEAKNESS: 0
MYALGIAS: 0
DYSPNEA ON EXERTION: 0
SPUTUM PRODUCTION: 0
FALLS: 0
MEMORY LOSS: 0
DIZZINESS: 0
PALPITATIONS: 0
SLEEP DISTURBANCES DUE TO BREATHING: 0
VERTIGO: 0
CHANGE IN BOWEL HABIT: 0
COUGH: 0
ABDOMINAL PAIN: 0
JAUNDICE: 0
HEADACHES: 0
IRREGULAR HEARTBEAT: 0
PND: 0
BRUISES/BLEEDS EASILY: 0
HEMOPTYSIS: 0
HEMATOLOGIC/LYMPHATIC NEGATIVE: 1
ANOREXIA: 0
WEIGHT GAIN: 0
HEARTBURN: 0
NUMBNESS: 0
NEAR-SYNCOPE: 0
DIARRHEA: 0
MUSCLE CRAMPS: 0
FREQUENCY: 0
CLAUDICATION: 0
WHEEZING: 0
ORTHOPNEA: 0
WEIGHT LOSS: 0
TREMORS: 0
HOARSE VOICE: 0
CONSTIPATION: 0
INSOMNIA: 0
NERVOUS/ANXIOUS: 0

## 2019-06-11 ENCOUNTER — OFFICE VISIT (OUTPATIENT)
Dept: CARDIOLOGY | Facility: CLINIC | Age: 69
End: 2019-06-11
Payer: MEDICARE

## 2019-06-11 VITALS
WEIGHT: 160 LBS | OXYGEN SATURATION: 97 % | BODY MASS INDEX: 25.11 KG/M2 | SYSTOLIC BLOOD PRESSURE: 126 MMHG | DIASTOLIC BLOOD PRESSURE: 70 MMHG | HEART RATE: 77 BPM | HEIGHT: 67 IN

## 2019-06-11 DIAGNOSIS — E78.2 MIXED HYPERLIPIDEMIA: ICD-10-CM

## 2019-06-11 DIAGNOSIS — I25.10 CORONARY ARTERY DISEASE INVOLVING NATIVE CORONARY ARTERY OF NATIVE HEART WITHOUT ANGINA PECTORIS: Primary | ICD-10-CM

## 2019-06-11 PROCEDURE — 93000 ELECTROCARDIOGRAM COMPLETE: CPT | Performed by: INTERNAL MEDICINE

## 2019-06-11 PROCEDURE — 99213 OFFICE O/P EST LOW 20 MIN: CPT | Performed by: INTERNAL MEDICINE

## 2019-06-11 NOTE — LETTER
June 11, 2019     Eliot Garcia MD  [address]   [address]  [facility] [address]    Patient: Jossue Freire  YOB: 1950  Date of Visit: 6/11/2019      Dear Ricardo    Thank you for referring Jossue Freire to me for evaluation. Below are my notes for this consultation.    If you have questions, please do not hesitate to call me. I look forward to following your patient along with you.         Sincerely,        Liam Mckeon MD        CC: No Recipients  Liam Mckeon MD  6/11/2019  8:06 AM  Sign at close encounter      Cardiology Note    Eliot Garcia MD          Jossue Freire is a 68 y.o. male 1950     He returns for follow-up of lab work after beginning antiplatelet and statin therapy  He has nonobstructive CAD based on coronary CT angiogram February 2019 with a score of 38 and 30% lesions  EKG is abnormal with fluctuating inferior T wave inversions  His baseline LDL cholesterol was 90  On Crestor it is now 52, perfect                 Patient Active Problem List    Diagnosis Date Noted   • Mixed hyperlipidemia 06/07/2019   • Coronary artery disease involving native coronary artery of native heart without angina pectoris 03/01/2019   • Fatty liver 02/12/2019   • Family history of early CAD 02/12/2019   • Essential hypertension 02/11/2019   • Other chest pain 02/11/2019       Allergy  Patient has no known allergies.    MED LIST     Current Outpatient Prescriptions   Medication Sig Dispense Refill   • amLODIPine (NORVASC) 2.5 mg tablet Take 2.5 mg by mouth daily.     • aspirin (ASPIR-81) 81 mg enteric coated tablet Take 1 tablet (81 mg total) by mouth daily. 90 tablet 1   • diazePAM (VALIUM) 2 mg tablet Take 2 mg by mouth nightly.     • finasteride (PROSCAR) 5 mg tablet Take 5 mg by mouth once daily.  4   • folic acid/multivit-min/lutein (CENTRUM SILVER ORAL) Take by mouth.     • LORazepam (ATIVAN) 0.5 mg tablet Take 0.5 mg by mouth as needed.    0   • magnesium 250 mg  tablet Take 250 mg by mouth daily. Patient takes 2  Tablets daily     • naproxen sodium (ANAPROX) 550 mg tablet Take 550 mg by mouth as needed.    6   • omeprazole (PriLOSEC) 40 mg capsule Take 40 mg by mouth once daily.  3   • rosuvastatin (CRESTOR) 20 mg tablet Take 1 tablet (20 mg total) by mouth daily. 90 tablet 1   • tamsulosin (FLOMAX) 0.4 mg capsule Take 0.8 mg by mouth nightly.  3     No current facility-administered medications for this visit.         Review of Systems   Constitution: Negative for malaise/fatigue, weight gain and weight loss.   HENT: Negative for hearing loss and hoarse voice.    Eyes: Negative for visual disturbance.   Cardiovascular: Negative for chest pain, claudication, cyanosis, dyspnea on exertion, irregular heartbeat, leg swelling, near-syncope, orthopnea, palpitations, paroxysmal nocturnal dyspnea and syncope.   Respiratory: Negative for cough, hemoptysis, shortness of breath, sleep disturbances due to breathing, snoring, sputum production and wheezing.    Endocrine: Negative for cold intolerance and heat intolerance.   Hematologic/Lymphatic: Negative.  Negative for bleeding problem. Does not bruise/bleed easily.   Skin: Negative.  Negative for rash.   Musculoskeletal: Negative for arthritis, falls, joint pain, muscle cramps and myalgias.   Gastrointestinal: Negative for abdominal pain, anorexia, change in bowel habit, constipation, diarrhea, dysphagia, heartburn, jaundice and nausea.   Genitourinary: Negative for frequency and nocturia.   Neurological: Negative for dizziness, focal weakness, headaches, light-headedness, numbness, tremors and vertigo.   Psychiatric/Behavioral: Negative for memory loss. The patient does not have insomnia and is not nervous/anxious.    Allergic/Immunologic: Negative for hives.       Labs       June 2019          No results found for: WBC, HGB, HCT, PLT, CHOL, TRIG, HDL, LDLDIRECT, TOTLDLC, LDLCALC, ALT, AST, NA, K, CL, CREATININE, BUN, CO2, TSH, INR,  "HGBA1C, BNP    No results found for: GLUCOSE, CALCIUM, NA, K, CO2, CL, BUN, CREATININE    Objective   Vitals:    06/11/19 0753   BP: 126/70   Pulse: 77   SpO2: 97%   Weight: 72.6 kg (160 lb)   Height: 1.702 m (5' 7\")     Physical Exam   Constitutional: He is oriented to person, place, and time. He appears well-developed and well-nourished. He is active.  Non-toxic appearance. He does not have a sickly appearance. He does not appear ill. No distress. He is not intubated.   HENT:   Head: Normocephalic. Not microcephalic. Head is without raccoon's eyes, without abrasion and without contusion.   Nose: Nose normal.   Eyes: EOM are normal. Left eye exhibits no discharge and no exudate. Right conjunctiva is not injected. Left conjunctiva is not injected. Left conjunctiva has no hemorrhage. No scleral icterus. Pupils are equal.   Neck: Normal range of motion. Neck supple. Normal carotid pulses and no hepatojugular reflux present. Carotid bruit is not present.   Cardiovascular: Normal rate, regular rhythm, normal heart sounds, intact distal pulses and normal pulses.  PMI is not displaced.  Exam reveals no gallop and no friction rub.    No murmur heard.  Pulmonary/Chest: Effort normal and breath sounds normal. No stridor. No apnea, no tachypnea and no bradypnea. He is not intubated. No respiratory distress. He has no wheezes. He has no rales. He exhibits no tenderness.   Abdominal: Soft. Normal appearance, normal aorta and bowel sounds are normal. He exhibits no distension. There is no tenderness.   Musculoskeletal: Normal range of motion. He exhibits no edema or deformity.   Neurological: He is alert and oriented to person, place, and time.   Skin: No abrasion, no bruising, no ecchymosis and no rash noted. He is not diaphoretic. No erythema. No pallor.   Psychiatric: He has a normal mood and affect. His mood appears not anxious. His affect is not angry, not blunt, not labile and not inappropriate. His speech is not slurred. " He is not agitated, not aggressive, not withdrawn and not combative. He does not exhibit a depressed mood. He is communicative.       Cardiac Procedures    STRESS TEST  Stress echo neg 2/19    CAT SCAN   cta feb 2019COR CTA SCORE 38 LAD [id number]% MID MID RCA 30%   FATTY LIVER     SUMMARY:COR CTA 2/19  1. Nonobstructive two-vessel coronary artery disease. There is focal calcified  plaque involving the proximal and mid LAD with less than 30% stenosis. There is  focal calcified plaque in the mid RCA with less than 30% stenosis.  2.  Normal cardiac structures.  3.  Normal left ventricular systolic function. Left ventricular ejection  fraction 50%.  4.  Small hiatal hernia.  5.  Coronary calcium score 38.  This places the patient in the GREEN 80th risk  percentile for age and gender matched individuals  EKG    Assessment/Plan:  Coronary artery disease involving native coronary artery of native heart without angina pectoris  Nonobstructive CAD diagnosed by coronary calcium score and coronary CT angiogram done in February 2019 also negative stress echo at that time on preventive therapy with aspirin and statin lab work looks great repeat ischemic evaluation to 3 years    Mixed hyperlipidemia  Great response to Crestor LDL 52    Essential hypertension  Blood pressure controlled on amlodipine           I will see him back in 1 year with lab ischemic evaluation in 2 to 3 years        Thank you for allowing me to participate in the care of this patient.  I hope this information is helpful.    Liam Mckeon MD [facility]   6/11/2019  Cc Eliot Garcia MD

## 2019-06-11 NOTE — ASSESSMENT & PLAN NOTE
Nonobstructive CAD diagnosed by coronary calcium score and coronary CT angiogram done in February 2019 also negative stress echo at that time on preventive therapy with aspirin and statin lab work looks great repeat ischemic evaluation to 3 years

## 2019-08-29 RX ORDER — ROSUVASTATIN CALCIUM 20 MG/1
20 TABLET, COATED ORAL DAILY
Qty: 90 TABLET | Refills: 3 | Status: SHIPPED | OUTPATIENT
Start: 2019-08-29 | End: 2020-11-04 | Stop reason: SDUPTHER

## 2019-08-29 RX ORDER — ASPIRIN 81 MG/1
81 TABLET ORAL DAILY
Qty: 90 TABLET | Refills: 3 | Status: SHIPPED | OUTPATIENT
Start: 2019-08-29 | End: 2020-08-14

## 2020-06-03 ENCOUNTER — TELEPHONE (OUTPATIENT)
Dept: CARDIOLOGY | Facility: CLINIC | Age: 70
End: 2020-06-03

## 2020-06-03 NOTE — TELEPHONE ENCOUNTER
Pt called me back- he is rescheduled to 8/14/2020.    He is due for labs on 6/10/2020 with [facility]. He will have a copy of labs sent to [place].

## 2020-06-03 NOTE — TELEPHONE ENCOUNTER
I called pt to confirm OV -- 6/9/2020- [facility] for pt to call me back.    I need to speak to pt when he/she calls back.

## 2020-08-11 ASSESSMENT — ENCOUNTER SYMPTOMS
WEIGHT LOSS: 0
WHEEZING: 0
DYSPNEA ON EXERTION: 0
FALLS: 0
WEIGHT GAIN: 0
CONSTIPATION: 0
FOCAL WEAKNESS: 0
PND: 0
CLAUDICATION: 0
SHORTNESS OF BREATH: 0
HEMOPTYSIS: 0
DIZZINESS: 0
PALPITATIONS: 0
SLEEP DISTURBANCES DUE TO BREATHING: 0
MYALGIAS: 0
CHANGE IN BOWEL HABIT: 0
SNORING: 0
IRREGULAR HEARTBEAT: 0
NUMBNESS: 0
DIARRHEA: 0
TREMORS: 0
JAUNDICE: 0
LIGHT-HEADEDNESS: 0
HEMATOLOGIC/LYMPHATIC NEGATIVE: 1
BRUISES/BLEEDS EASILY: 0
VERTIGO: 0
HOARSE VOICE: 0
COUGH: 0
MUSCLE CRAMPS: 0
HEARTBURN: 0
NERVOUS/ANXIOUS: 0
ABDOMINAL PAIN: 0
MEMORY LOSS: 0
SYNCOPE: 0
NEAR-SYNCOPE: 0
NAUSEA: 0
ANOREXIA: 0
FREQUENCY: 0
SPUTUM PRODUCTION: 0
INSOMNIA: 0
ORTHOPNEA: 0
HEADACHES: 0

## 2020-08-11 NOTE — PROGRESS NOTES
Cardiology Note    Eliot Garcia MD          Jossue Freire is a 69 y.o. male 1950     He returns for follow-up visit  Preop evaluation  He was recently diagnosed with bladder cancer and tells me he will be having  Sounds like partial cystectomy robotically done [facility] with Dr. Johnie Duran in September  He had recent cystoscopy and diagnosis at some urinary retention has a catheter in place at the moment  After cystoscopy his EKG was abnormal with frequent APCs he brought me that EKG to the office which I reviewed otherwise it showed no changes of ischemia    He has a diagnosis of nonobstructive CAD based on coronary CT angiogram February 2019  With an accompanying score of 38-   stress echo at that time with normal LV systolic function  He is treated for mixed hyperlipidemia and hypertension  He has no cardiovascular complaints               Recent lab work August 2020 LDL cholesterol goal 41 triglycerides mildly elevated 160  Creatinine 0.9 potassium 3.9    CBC normal TSH normal          Assessment/Plan:  Coronary artery disease involving native coronary artery of native heart without angina pectoris  Nonobstructive CAD diagnosed by coronary calcium score and coronary CT angiogram done in February 2019 also negative stress echo at that time on preventive therapy with aspirin and statin lab work looks great repeat ischemic evaluation to 3 years     Mixed hyperlipidemia  Great response to Crestor LDL 52     Essential hypertension  Blood pressure controlled on amlodipine              I will see him back in 1 year with lab ischemic evaluation in 2 to 3 years           Thank you for allowing me to participate in the care of this patient                                He returns for follow-up of lab work after beginning antiplatelet and statin therapy  He has nonobstructive CAD based on coronary CT angiogram February 2019 with a score of 38 and 30% lesions  EKG is abnormal with  fluctuating inferior T wave inversions  His baseline LDL cholesterol was 90  On Crestor it is now 52, perfect                 Patient Active Problem List    Diagnosis Date Noted   • Bladder cancer (CMS/HCC) 08/14/2020   • Atrial premature contractions 08/14/2020   • Mixed hyperlipidemia 06/07/2019   • Coronary artery disease involving native coronary artery of native heart without angina pectoris 03/01/2019   • Fatty liver 02/12/2019   • Family history of early CAD 02/12/2019   • Essential hypertension 02/11/2019   • Other chest pain 02/11/2019       Allergy  Patient has no known allergies.    MED LIST     Current Outpatient Medications   Medication Sig Dispense Refill   • amLODIPine (NORVASC) 2.5 mg tablet Take 2.5 mg by mouth daily.     • finasteride (PROSCAR) 5 mg tablet Take 5 mg by mouth once daily.  4   • folic acid/multivit-min/lutein (CENTRUM SILVER ORAL) Take by mouth.     • gabapentin (NEURONTIN) 300 mg capsule Take 300 mg by mouth nightly.     • LORazepam (ATIVAN) 0.5 mg tablet Take 0.5 mg by mouth as needed.    0   • omeprazole (PriLOSEC) 40 mg capsule Take 40 mg by mouth once daily.  3   • rosuvastatin (CRESTOR) 20 mg tablet Take 1 tablet (20 mg total) by mouth daily. 90 tablet 3   • tadalafiL (CIALIS) 5 mg tablet Take 5 mg by mouth daily as needed for erectile dysfunction.     • tamsulosin (FLOMAX) 0.4 mg capsule Take 0.8 mg by mouth nightly.  3   • diazePAM (VALIUM) 2 mg tablet Take 2 mg by mouth nightly.     • metoprolol succinate XL (TOPROL-XL) 25 mg 24 hr tablet Take 1 tablet (25 mg total) by mouth daily. 90 tablet 3   • metoprolol succinate XL (TOPROL-XL) 25 mg 24 hr tablet Take 1 tablet (25 mg total) by mouth daily. 90 tablet 3     No current facility-administered medications for this visit.         Review of Systems   Constitution: Negative for malaise/fatigue, weight gain and weight loss.   HENT: Negative for hearing loss and hoarse voice.    Eyes: Negative for visual disturbance.  "  Cardiovascular: Negative for chest pain, claudication, cyanosis, dyspnea on exertion, irregular heartbeat, leg swelling, near-syncope, orthopnea, palpitations, paroxysmal nocturnal dyspnea and syncope.   Respiratory: Negative for cough, hemoptysis, shortness of breath, sleep disturbances due to breathing, snoring, sputum production and wheezing.    Endocrine: Negative for cold intolerance and heat intolerance.   Hematologic/Lymphatic: Negative.  Negative for bleeding problem. Does not bruise/bleed easily.   Skin: Negative.  Negative for rash.   Musculoskeletal: Negative for arthritis, falls, joint pain, muscle cramps and myalgias.   Gastrointestinal: Negative for abdominal pain, anorexia, change in bowel habit, constipation, diarrhea, dysphagia, heartburn, jaundice and nausea.   Genitourinary: Negative for frequency and nocturia.        Hennessy catheter in place   Neurological: Negative for dizziness, focal weakness, headaches, light-headedness, numbness, tremors and vertigo.   Psychiatric/Behavioral: Negative for memory loss. The patient does not have insomnia and is not nervous/anxious.    Allergic/Immunologic: Negative for hives.       Labs       June 2019          No results found for: WBC, HGB, HCT, PLT, CHOL, TRIG, HDL, LDLDIRECT, TOTLDLC, LDLCALC, ALT, AST, NA, K, CL, CREATININE, BUN, CO2, TSH, INR, HGBA1C, BNP    No results found for: GLUCOSE, CALCIUM, NA, K, CO2, CL, BUN, CREATININE    Objective   Vitals:    08/14/20 1100   BP: (!) 160/90   BP Location: Right upper arm   Patient Position: Sitting   Pulse: 83   SpO2: 98%   Weight: 67.1 kg (148 lb)   Height: 1.702 m (5' 7\")     Physical Exam   Constitutional: He is oriented to person, place, and time. He appears well-developed and well-nourished. He is active.  Non-toxic appearance. He does not have a sickly appearance. He does not appear ill. No distress. He is not intubated.   HENT:   Head: Normocephalic. Not microcephalic. Head is without raccoon's eyes, " without abrasion and without contusion.   Nose: Nose normal.   Eyes: EOM are normal. Left eye exhibits no discharge and no exudate. Right conjunctiva is not injected. Left conjunctiva is not injected. Left conjunctiva has no hemorrhage. No scleral icterus. Pupils are equal.   Neck: Normal range of motion. Neck supple. Normal carotid pulses and no hepatojugular reflux present. Carotid bruit is not present.   Cardiovascular: Normal rate, regular rhythm, normal heart sounds, intact distal pulses and normal pulses. PMI is not displaced. Exam reveals no gallop and no friction rub.   No murmur heard.  Pulmonary/Chest: Effort normal and breath sounds normal. No stridor. No apnea, no tachypnea and no bradypnea. He is not intubated. No respiratory distress. He has no wheezes. He has no rales. He exhibits no tenderness.   Abdominal: Soft. Normal appearance, normal aorta and bowel sounds are normal. He exhibits no distension. There is no tenderness.   Genitourinary:   Genitourinary Comments: Hennessy catheter in place   Musculoskeletal: Normal range of motion. He exhibits no edema or deformity.   Neurological: He is alert and oriented to person, place, and time.   Skin: No abrasion, no bruising, no ecchymosis and no rash noted. He is not diaphoretic. No erythema. No pallor.   Psychiatric: He has a normal mood and affect. His mood appears not anxious. His affect is not angry, not blunt, not labile and not inappropriate. His speech is not slurred. He is not agitated, not aggressive, not withdrawn and not combative. He does not exhibit a depressed mood. He is communicative.       Cardiac Procedures    STRESS TEST  Stress echo neg 2/19    CAT SCAN   cta feb 2019COR CTA SCORE 38 LAD [id number]% MID MID RCA 30%   FATTY LIVER     SUMMARY:COR CTA 2/19  1. Nonobstructive two-vessel coronary artery disease. There is focal calcified  plaque involving the proximal and mid LAD with less than 30% stenosis. There is  focal calcified plaque in  the mid RCA with less than 30% stenosis.  2.  Normal cardiac structures.  3.  Normal left ventricular systolic function. Left ventricular ejection  fraction 50%.  4.  Small hiatal hernia.  5.  Coronary calcium score 38.  This places the patient in the GREEN 80th risk  percentile for age and gender matched individuals    ELECTROPHYSIOLOGY  HOLTER 9/2020 NSR 72 6% APC      EKG    EKG [facility] August 2020 normal with APCs reviewed    ekg aug 2020 normal      Assessment/Plan:  Coronary artery disease involving native coronary artery of native heart without angina pectoris  Nonobstructive CAD diagnosed by coronary CT angiogram February 2019 with normal LV systolic function total coronary calcium score that time 38    Atrial premature contractions  He had recent cystoscopy with diagnosis of bladder cancer August 2020 he told me in the recovery room he had frequent APCs in he thought his EKG with him that I could review otherwise EKG was normal start him on beta-blocker today also has mild hypertension we will follow-up with Holter monitor at present beta-blocker and echocardiogram if studies unremarkable no contraindication to undergoing what sounds like robotic partial cystectomy    Essential hypertension  Blood pressure mildly elevated he is on amlodipine with frequent APCs with recent cystoscopy on a beta-blocker today follow-up with cardiac monitor and echocardiogram    Mixed hyperlipidemia  On rosuvastatin cholesterol goal LDL 40    Bladder cancer (CMS/HCC)  Follows with Dr. Johnie Duran recent diagnosis of bladder cancer by the patient's description sounds like he will be undergoing at least partial cystectomy robotically TURBT complicated by frequent APCs in the recovery room patient has nonobstructive CAD start him on beta-blocker today follow-up with Holter monitor and echo will review the if no significant dysrhythmia normal LV systolic function no cardiac contraindication to undergoing urologic  surgery             Recently diagnosed with bladder cancer will be to undergo resection with sounds like with robotic possibly partial cystectomy  He had recent TURBT complicated by frequent APCs in recovery according to the patient and EKG brought me  EKG was otherwise normal no signs of ischemia patient has no symptoms of angina  He has nonobstructive CAD  Evaluated with coronary CT angiogram in 2019  Started beta-blocker therapy today  Follow-up with Holter monitor and resting echocardiogram  If normal LV systolic function no significant dysrhythmia no cardiac contraindications to undergoing urologic surgery      addendum sept 2020    Cardiovascular status stable to undergo urologic surgery  Started on toprol for frequent apc  6% burden on recent holter monitor  I asked him to take his toprol am of surgery                This letter was generated using speech recognition software.  Please excuse any typographical errors.    Thank you for allowing me to participate in the care of this patient.  I hope this information is helpful.    Liam cMkeon MD [facility]   8/14/2020  Cc Eliot Garcia MD

## 2020-08-12 NOTE — TELEPHONE ENCOUNTER
I called pt to confirm OV -- 8/14/2020 -- LMOM for pt to call me back.    I need to speak to pt when he/she calls back.    Did pt have labs done, where?

## 2020-08-14 ENCOUNTER — OFFICE VISIT (OUTPATIENT)
Dept: CARDIOLOGY | Facility: CLINIC | Age: 70
End: 2020-08-14
Payer: MEDICARE

## 2020-08-14 VITALS
WEIGHT: 148 LBS | HEIGHT: 67 IN | OXYGEN SATURATION: 98 % | HEART RATE: 83 BPM | SYSTOLIC BLOOD PRESSURE: 160 MMHG | BODY MASS INDEX: 23.23 KG/M2 | DIASTOLIC BLOOD PRESSURE: 90 MMHG

## 2020-08-14 DIAGNOSIS — E78.2 MIXED HYPERLIPIDEMIA: ICD-10-CM

## 2020-08-14 DIAGNOSIS — D13.91 APC (ADENOMATOUS POLYPOSIS COLI): ICD-10-CM

## 2020-08-14 DIAGNOSIS — I25.10 CORONARY ARTERY DISEASE INVOLVING NATIVE CORONARY ARTERY OF NATIVE HEART WITHOUT ANGINA PECTORIS: Primary | ICD-10-CM

## 2020-08-14 DIAGNOSIS — I10 ESSENTIAL HYPERTENSION: ICD-10-CM

## 2020-08-14 PROBLEM — C67.9 BLADDER CANCER (CMS/HCC): Status: ACTIVE | Noted: 2020-08-14

## 2020-08-14 PROBLEM — I49.1 ATRIAL PREMATURE CONTRACTIONS: Status: ACTIVE | Noted: 2020-08-14

## 2020-08-14 PROCEDURE — 93000 ELECTROCARDIOGRAM COMPLETE: CPT | Performed by: INTERNAL MEDICINE

## 2020-08-14 PROCEDURE — 99214 OFFICE O/P EST MOD 30 MIN: CPT | Performed by: INTERNAL MEDICINE

## 2020-08-14 RX ORDER — GABAPENTIN 300 MG/1
300 CAPSULE ORAL NIGHTLY
COMMUNITY

## 2020-08-14 RX ORDER — METOPROLOL SUCCINATE 25 MG/1
25 TABLET, EXTENDED RELEASE ORAL DAILY
Qty: 90 TABLET | Refills: 3 | Status: SHIPPED | OUTPATIENT
Start: 2020-08-14 | End: 2020-10-09

## 2020-08-14 RX ORDER — TADALAFIL 5 MG/1
5 TABLET ORAL DAILY PRN
COMMUNITY

## 2020-08-14 RX ORDER — METOPROLOL SUCCINATE 25 MG/1
25 TABLET, EXTENDED RELEASE ORAL DAILY
Qty: 90 TABLET | Refills: 3 | Status: SHIPPED | OUTPATIENT
Start: 2020-08-14 | End: 2020-10-09 | Stop reason: SDUPTHER

## 2020-08-14 NOTE — ASSESSMENT & PLAN NOTE
Nonobstructive CAD diagnosed by coronary CT angiogram February 2019 with normal LV systolic function total coronary calcium score that time 38

## 2020-08-14 NOTE — ASSESSMENT & PLAN NOTE
He had recent cystoscopy with diagnosis of bladder cancer August 2020 he told me in the recovery room he had frequent APCs in he thought his EKG with him that I could review otherwise EKG was normal start him on beta-blocker today also has mild hypertension we will follow-up with Holter monitor at present beta-blocker and echocardiogram if studies unremarkable no contraindication to undergoing what sounds like robotic partial cystectomy

## 2020-08-14 NOTE — PATIENT INSTRUCTIONS
New toprol  25 mg a day  Next week come back when you can for monitor  Come back when you can before surgery for echo prior to surgery and appt pre op

## 2020-08-14 NOTE — ASSESSMENT & PLAN NOTE
Blood pressure mildly elevated he is on amlodipine with frequent APCs with recent cystoscopy on a beta-blocker today follow-up with cardiac monitor and echocardiogram

## 2020-08-14 NOTE — ASSESSMENT & PLAN NOTE
Follows with Dr. Johnie Duran recent diagnosis of bladder cancer by the patient's description sounds like he will be undergoing at least partial cystectomy robotically TURBT complicated by frequent APCs in the recovery room patient has nonobstructive CAD start him on beta-blocker today follow-up with Holter monitor and echo will review the if no significant dysrhythmia normal LV systolic function no cardiac contraindication to undergoing urologic surgery

## 2020-08-28 ENCOUNTER — TELEPHONE (OUTPATIENT)
Dept: SCHEDULING | Facility: CLINIC | Age: 70
End: 2020-08-28

## 2020-09-02 ENCOUNTER — TELEPHONE (OUTPATIENT)
Dept: CARDIOLOGY | Facility: CLINIC | Age: 70
End: 2020-09-02

## 2020-09-04 ENCOUNTER — HOSPITAL ENCOUNTER (OUTPATIENT)
Dept: CARDIOLOGY | Facility: CLINIC | Age: 70
Discharge: HOME | End: 2020-09-04
Attending: INTERNAL MEDICINE
Payer: MEDICARE

## 2020-09-04 DIAGNOSIS — D13.91 APC (ADENOMATOUS POLYPOSIS COLI): ICD-10-CM

## 2020-09-09 PROCEDURE — 93224 XTRNL ECG REC UP TO 48 HRS: CPT | Performed by: INTERNAL MEDICINE

## 2020-09-11 ENCOUNTER — TELEPHONE (OUTPATIENT)
Dept: CARDIOLOGY | Facility: CLINIC | Age: 70
End: 2020-09-11

## 2020-09-11 NOTE — TELEPHONE ENCOUNTER
Per Dr. Mckeon's Verbal instructions,  I called patient and lmom that Dr. Mckeon will clear him for surgery on Monday and I also informed him that holter results are ok but Dr. Mckeon will make some adjustments to his Metoprolol Dosage (Possibly increase). Dr. Mckeon will call patient on Monday and discuss the plan in detail with him.,

## 2020-09-11 NOTE — TELEPHONE ENCOUNTER
Pt stopped by Arianna Gaffney. He wanted to be sure the holter was read and that he is cleared for his surgery next week.    Please notify pt.

## 2020-09-12 ENCOUNTER — TELEPHONE (OUTPATIENT)
Dept: CARDIOLOGY | Facility: CLINIC | Age: 70
End: 2020-09-12

## 2020-09-12 NOTE — TELEPHONE ENCOUNTER
"I spoke to him about results of the monitor increase his Toprol to twice daily told him to take Toprol the morning of surgery he was \"cleared\" for surgery and made addendum to his last letter  Please send the last letter to Dr. Duran the urologist at [facility]  "

## 2020-09-14 NOTE — TELEPHONE ENCOUNTER
Dr. Mckeon, Cardiac Clearance Letter faxed over to Dr. Johnie Duran, Urology, [facility] today, faxed to [phone]

## 2020-10-06 ASSESSMENT — ENCOUNTER SYMPTOMS
CHANGE IN BOWEL HABIT: 0
CLAUDICATION: 0
CONSTIPATION: 0
HEADACHES: 0
SPUTUM PRODUCTION: 0
HEMOPTYSIS: 0
WHEEZING: 0
MEMORY LOSS: 0
HEARTBURN: 0
FREQUENCY: 0
MUSCLE CRAMPS: 0
HOARSE VOICE: 0
SLEEP DISTURBANCES DUE TO BREATHING: 0
TREMORS: 0
MYALGIAS: 0
ORTHOPNEA: 0
INSOMNIA: 0
BRUISES/BLEEDS EASILY: 0
DYSPNEA ON EXERTION: 0
ANOREXIA: 0
FOCAL WEAKNESS: 0
NEAR-SYNCOPE: 0
DIARRHEA: 0
PND: 0
NAUSEA: 0
COUGH: 0
WEIGHT LOSS: 0
ABDOMINAL PAIN: 0
VERTIGO: 0
WEIGHT GAIN: 0
FALLS: 0
PALPITATIONS: 0
JAUNDICE: 0
SYNCOPE: 0
IRREGULAR HEARTBEAT: 0
HEMATOLOGIC/LYMPHATIC NEGATIVE: 1
NUMBNESS: 0
DIZZINESS: 0
SNORING: 0
LIGHT-HEADEDNESS: 0
NERVOUS/ANXIOUS: 0
SHORTNESS OF BREATH: 0

## 2020-10-06 NOTE — PROGRESS NOTES
Cardiology Note    Eliot Garcia MD          Jossue Freire is a 69 y.o. male 1950   He returns today for follow-up visit and echocardiogram  He returns after partial cystectomy September 2020 and reimplantation of right ureter  There is a  question of remaining focal muscle invasion of tumor and he said no further intervention at this time  He follows at [facility]    Cardiac wise he has nonobstructive CAD documented by CT angiogram February 2019 with a corresponding score of 38 normal LV systolic function  I admitted him for frequent APCs controlled with beta-blocker hypertension mixed hyperlipidemia    His EKG today October 2020 is new anterior T wave inversions  Echocardiogram no new wall motion abnormalities mild regurgitant lesions seen preserved ejection fraction    El                                   Patient Active Problem List    Diagnosis Date Noted   • Bladder cancer (CMS/HCC) 08/14/2020   • Atrial premature contractions 08/14/2020   • Mixed hyperlipidemia 06/07/2019   • Coronary artery disease involving native coronary artery of native heart without angina pectoris 03/01/2019   • Fatty liver 02/12/2019   • Family history of early CAD 02/12/2019   • Essential hypertension 02/11/2019       Allergy  Patient has no known allergies.    MED LIST     Current Outpatient Medications   Medication Sig Dispense Refill   • amLODIPine (NORVASC) 2.5 mg tablet Take 2.5 mg by mouth daily.     • finasteride (PROSCAR) 5 mg tablet Take 5 mg by mouth once daily.  4   • folic acid/multivit-min/lutein (CENTRUM SILVER ORAL) Take by mouth.     • gabapentin (NEURONTIN) 300 mg capsule Take 300 mg by mouth nightly.     • LORazepam (ATIVAN) 0.5 mg tablet Take 0.5 mg by mouth as needed.    0   • metoprolol succinate XL (TOPROL-XL) 25 mg 24 hr tablet Take 1 tablet (25 mg total) by mouth 2 (two) times a day. 180 tablet 3   • omeprazole (PriLOSEC) 40 mg capsule Take 40 mg by mouth once daily.  3   •  rosuvastatin (CRESTOR) 20 mg tablet Take 1 tablet (20 mg total) by mouth daily. 90 tablet 3   • tadalafiL (CIALIS) 5 mg tablet Take 5 mg by mouth daily as needed for erectile dysfunction.     • tamsulosin (FLOMAX) 0.4 mg capsule Take 0.8 mg by mouth nightly.  3   • diazePAM (VALIUM) 2 mg tablet Take 2 mg by mouth nightly.       No current facility-administered medications for this visit.         Review of Systems   Constitution: Negative for malaise/fatigue, weight gain and weight loss.   HENT: Negative for hearing loss and hoarse voice.    Eyes: Negative for visual disturbance.   Cardiovascular: Negative for chest pain, claudication, cyanosis, dyspnea on exertion, irregular heartbeat, leg swelling, near-syncope, orthopnea, palpitations, paroxysmal nocturnal dyspnea and syncope.   Respiratory: Negative for cough, hemoptysis, shortness of breath, sleep disturbances due to breathing, snoring, sputum production and wheezing.    Endocrine: Negative for cold intolerance and heat intolerance.   Hematologic/Lymphatic: Negative.  Negative for bleeding problem. Does not bruise/bleed easily.   Skin: Negative.  Negative for rash.   Musculoskeletal: Negative for arthritis, falls, joint pain, muscle cramps and myalgias.   Gastrointestinal: Negative for abdominal pain, anorexia, change in bowel habit, constipation, diarrhea, dysphagia, heartburn, jaundice and nausea.   Genitourinary: Negative for frequency and nocturia.        Hennessy catheter in place   Neurological: Negative for dizziness, focal weakness, headaches, light-headedness, numbness, tremors and vertigo.   Psychiatric/Behavioral: Negative for memory loss. The patient does not have insomnia and is not nervous/anxious.    Allergic/Immunologic: Negative for hives.       Labs       June 2019          No results found for: WBC, HGB, HCT, PLT, CHOL, TRIG, HDL, LDLDIRECT, TOTLDLC, LDLCALC, ALT, AST, NA, K, CL, CREATININE, BUN, CO2, TSH, INR, HGBA1C, BNP    No results found  "for: GLUCOSE, CALCIUM, NA, K, CO2, CL, BUN, CREATININE    Objective   Vitals:    10/09/20 1326   BP: 130/62   BP Location: Left upper arm   Patient Position: Sitting   Pulse: 98   SpO2: 97%   Weight: 63.5 kg (140 lb)   Height: 1.702 m (5' 7\")     Physical Exam   Constitutional: He is oriented to person, place, and time. He appears well-developed and well-nourished. He is active.  Non-toxic appearance. He does not have a sickly appearance. He does not appear ill. No distress. He is not intubated.   HENT:   Head: Normocephalic. Not microcephalic. Head is without raccoon's eyes, without abrasion and without contusion.   Nose: Nose normal.   Eyes: EOM are normal. Left eye exhibits no discharge and no exudate. Right conjunctiva is not injected. Left conjunctiva is not injected. Left conjunctiva has no hemorrhage. No scleral icterus. Pupils are equal.   Neck: Normal range of motion. Neck supple. Normal carotid pulses and no hepatojugular reflux present. Carotid bruit is not present.   Cardiovascular: Normal rate, regular rhythm, normal heart sounds, intact distal pulses and normal pulses. PMI is not displaced. Exam reveals no gallop and no friction rub.   No murmur heard.  Pulmonary/Chest: Effort normal and breath sounds normal. No stridor. No apnea, no tachypnea and no bradypnea. He is not intubated. No respiratory distress. He has no wheezes. He has no rales. He exhibits no tenderness.   Abdominal: Soft. Normal appearance, normal aorta and bowel sounds are normal. He exhibits no distension. There is no abdominal tenderness.   Genitourinary:    Genitourinary Comments: Hennessy catheter in place     Musculoskeletal: Normal range of motion.         General: No deformity or edema.   Neurological: He is alert and oriented to person, place, and time.   Skin: No abrasion, no bruising, no ecchymosis and no rash noted. He is not diaphoretic. No erythema. No pallor.   Psychiatric: He has a normal mood and affect. His mood appears " not anxious. His affect is not angry, not blunt, not labile and not inappropriate. His speech is not slurred. He is not agitated, not aggressive, not withdrawn and not combative. He does not exhibit a depressed mood. He is communicative.       Cardiac Procedures    ECHO  Echo 10/20  Nl ef nild ai mr      STRESS TEST  Stress echo neg 2/19  Pharm nuc neg diaph attenuation  Nov 2020    CAT SCAN   cta feb 2019COR CTA SCORE 38 LAD [id number]% MID MID RCA 30%   FATTY LIVER     SUMMARY:COR CTA 2/19  1. Nonobstructive two-vessel coronary artery disease. There is focal calcified  plaque involving the proximal and mid LAD with less than 30% stenosis. There is  focal calcified plaque in the mid RCA with less than 30% stenosis.  2.  Normal cardiac structures.  3.  Normal left ventricular systolic function. Left ventricular ejection  fraction 50%.  4.  Small hiatal hernia.  5.  Coronary calcium score 38.  This places the patient in the GREEN 80th risk  percentile for age and gender matched individuals    ELECTROPHYSIOLOGY  HOLTER 9/2020 NSR 72 6% APC      EKG    EKG [facility] August 2020 normal with APCs reviewed    ekg aug 2020 normal        October 2020 new anterior T wave inversions                Aug 2020      Assessment/Plan:  Coronary artery disease involving native coronary artery of native heart without angina pectoris  He is a diagnosis of nonobstructive coronary disease made by CT angiogram February 2019 corresponding score of 38 normal LV systolic function echo today October 2020 mild mitral and aortic insufficiency preserved ejection fraction EKG preoperatively had atrial premature contractions frequent he had lots of atrial arrhythmias with cystoscopy I have him on beta-blocker for that he had his urologic surgery in August without cardiac complications but his EKG now postop shows new anterior T wave inversions  Echo postop no new regional wall motion abnormalities no suspicious symptoms will do  pharmacologic nuclear stress test rule out worsening of CAD  It may be a nonspecific finding need to exclude ischemic problem I asked him to follow-up with urology if it is okay to restart his aspirin 81 he still has a ureteral stent in place    Atrial premature contractions  Frequent APCs noted at time of cystoscopy controlled with beta-blocker therapy    Essential hypertension  Controlled beta-blocker calcium channel blocker    Mixed hyperlipidemia  On rosuvastatin LDL cholesterol 40    Bladder cancer (CMS/HCC)  August 2020 robotic resection cancer of diverticulum some residual disease and muscle plan at this time is observation he is having repeat cystoscopy in 3 months the patient told me residual stent in right ureter he is presently off aspirin asked him to recheck with urology if he can restart aspirin 81                           He underwent resection of bladder cancer in a diverticulum at [facility] August 2020  There was discussion of some remaining tumor in the muscle but he was told he can be watched with routine cystoscopy in 3 months time no further surgical intervention or chemotherapy now  He still has a stent in that is coming on next week I asked his urologist about restarting aspirin therapy 81 mg  I am concerned that this new change in his EKG anterior T wave inversions he has no symptoms  We will plan pharmacologic nuclear stress test and follow-up after that          This letter was generated using speech recognition software.  Please excuse any typographical errors.    Thank you for allowing me to participate in the care of this patient.  I hope this information is helpful.    Liam Mckeon MD [facility]   10/9/2020  Eliot Wilkins MD

## 2020-10-09 ENCOUNTER — HOSPITAL ENCOUNTER (OUTPATIENT)
Dept: CARDIOLOGY | Facility: CLINIC | Age: 70
Discharge: HOME | End: 2020-10-09
Attending: INTERNAL MEDICINE
Payer: MEDICARE

## 2020-10-09 ENCOUNTER — OFFICE VISIT (OUTPATIENT)
Dept: CARDIOLOGY | Facility: CLINIC | Age: 70
End: 2020-10-09
Payer: MEDICARE

## 2020-10-09 VITALS
BODY MASS INDEX: 21.97 KG/M2 | HEIGHT: 67 IN | HEART RATE: 98 BPM | SYSTOLIC BLOOD PRESSURE: 130 MMHG | OXYGEN SATURATION: 97 % | DIASTOLIC BLOOD PRESSURE: 62 MMHG | WEIGHT: 140 LBS

## 2020-10-09 VITALS
BODY MASS INDEX: 23.23 KG/M2 | HEIGHT: 67 IN | SYSTOLIC BLOOD PRESSURE: 160 MMHG | WEIGHT: 148 LBS | DIASTOLIC BLOOD PRESSURE: 90 MMHG

## 2020-10-09 DIAGNOSIS — I25.10 CORONARY ARTERY DISEASE INVOLVING NATIVE CORONARY ARTERY OF NATIVE HEART WITHOUT ANGINA PECTORIS: Primary | ICD-10-CM

## 2020-10-09 DIAGNOSIS — E78.2 MIXED HYPERLIPIDEMIA: ICD-10-CM

## 2020-10-09 DIAGNOSIS — I25.10 CORONARY ARTERY DISEASE INVOLVING NATIVE CORONARY ARTERY OF NATIVE HEART WITHOUT ANGINA PECTORIS: ICD-10-CM

## 2020-10-09 DIAGNOSIS — C67.9 MALIGNANT NEOPLASM OF URINARY BLADDER, UNSPECIFIED SITE (CMS/HCC): ICD-10-CM

## 2020-10-09 DIAGNOSIS — I10 ESSENTIAL HYPERTENSION: ICD-10-CM

## 2020-10-09 PROBLEM — R07.89 OTHER CHEST PAIN: Status: RESOLVED | Noted: 2019-02-11 | Resolved: 2020-10-09

## 2020-10-09 LAB
AORTIC ROOT ANNULUS: 3.6 CM
ASCENDING AORTA: 3.5 CM
AV PEAK GRADIENT: 6 MMHG
AV PEAK VELOCITY-S: 1.2 M/S
AV VALVE AREA: 2.62 CM2
BSA FOR ECHO PROCEDURE: 1.78 M2
CUSP SEPARATION: 1.8 CM
E WAVE DECELERATION TIME: 172 MS
E/A RATIO: 0.7
E/E' RATIO: 11.6
E/LAT E' RATIO: 8.6
EDV (BP): 51.7 CM3
EF (A4C): 68.4 %
EF A2C: 63 %
EJECTION FRACTION: 66 %
EST RIGHT VENT SYSTOLIC PRESSURE BY TRICUSPID REGURGITATION JET: 12 MMHG
ESV (BP): 17.6 CM3
FRACTIONAL SHORTENING: 37.4 %
INTERVENTRICULAR SEPTUM: 1 CM
LA ESV (BP): 25.7 CM3
LA ESV INDEX (A2C): 15.22 CM3/M2
LA ESV INDEX (BP): 14.44 CM3/M2
LA/AORTA RATIO: 0.97
LAAS-AP2: 11.8 CM2
LAAS-AP4: 11.5 CM2
LAD 2D: 3.5 CM
LALD A4C: 4.11 CM
LALD A4C: 4.35 CM
LAV-S: 27.1 CM3
LEFT ATRIUM VOLUME INDEX: 13.03 CM3/M2
LEFT ATRIUM VOLUME: 23.2 CM3
LEFT INTERNAL DIMENSION IN SYSTOLE: 2.73 CM (ref 2.47–3.74)
LEFT VENTRICLE DIASTOLIC VOLUME INDEX: 25.11 CM3/M2
LEFT VENTRICLE DIASTOLIC VOLUME: 44.7 CM3
LEFT VENTRICLE SYSTOLIC VOLUME INDEX: 7.92 CM3/M2
LEFT VENTRICLE SYSTOLIC VOLUME: 14.1 CM3
LEFT VENTRICULAR INTERNAL DIMENSION IN DIASTOLE: 4.36 CM (ref 4.17–5.8)
LEFT VENTRICULAR POSTERIOR WALL IN END DIASTOLE: 0.92 CM (ref 0.55–1.02)
LV DIASTOLIC VOLUME: 58.6 CM3
LV ESV (APICAL 2 CHAMBER): 21.7 CM3
LVAD-AP2: 22.9 CM2
LVAD-AP4: 20.3 CM2
LVAS-AP2: 12.1 CM2
LVAS-AP4: 9.9 CM2
LVEDVI(A2C): 32.92 CM3/M2
LVEDVI(BP): 29.04 CM3/M2
LVESVI(A2C): 12.19 CM3/M2
LVESVI(BP): 9.89 CM3/M2
LVLD-AP2: 7.52 CM
LVLD-AP4: 7.72 CM
LVLS-AP2: 6.04 CM
LVLS-AP4: 6.14 CM
LVOT 2D: 2.1 CM
LVOT A: 3.46 CM2
LVOT PEAK VELOCITY: 0.91 M/S
MV E'TISSUE VEL-LAT: 0.07 M/S
MV E'TISSUE VEL-MED: 0.05 M/S
MV PEAK A VEL: 0.88 M/S
MV PEAK E VEL: 0.59 M/S
POSTERIOR WALL: 0.92 CM
PV PEAK GRADIENT: 5 MMHG
PV PV: 1.14 M/S
RAP: 5 MMHG
RVOT VMAX: 0.84 M/S
SEPTAL TISSUE DOPPLER FREE WALL LATE DIA VELOCITY (APICAL 4 CHAMBER VIEW): 0.2 M/S
TR MAX PG: 7 MMHG
TRICUSPID VALVE PEAK REGURGITATION VELOCITY: 1.3 M/S
Z-SCORE OF LEFT VENTRICULAR DIMENSION IN END DIASTOLE: -1.2
Z-SCORE OF LEFT VENTRICULAR DIMENSION IN END SYSTOLE: -0.85
Z-SCORE OF LEFT VENTRICULAR POSTERIOR WALL IN END DIASTOLE: 1.1

## 2020-10-09 PROCEDURE — 99214 OFFICE O/P EST MOD 30 MIN: CPT | Performed by: INTERNAL MEDICINE

## 2020-10-09 PROCEDURE — 93306 TTE W/DOPPLER COMPLETE: CPT | Performed by: INTERNAL MEDICINE

## 2020-10-09 PROCEDURE — 93000 ELECTROCARDIOGRAM COMPLETE: CPT | Performed by: INTERNAL MEDICINE

## 2020-10-09 RX ORDER — METOPROLOL SUCCINATE 25 MG/1
25 TABLET, EXTENDED RELEASE ORAL 2 TIMES DAILY
Qty: 180 TABLET | Refills: 3 | Status: SHIPPED | OUTPATIENT
Start: 2020-10-09 | End: 2021-10-07

## 2020-10-09 NOTE — ASSESSMENT & PLAN NOTE
August 2020 robotic resection cancer of diverticulum some residual disease and muscle plan at this time is observation he is having repeat cystoscopy in 3 months the patient told me residual stent in right ureter he is presently off aspirin asked him to recheck with urology if he can restart aspirin 81

## 2020-10-09 NOTE — ASSESSMENT & PLAN NOTE
He is a diagnosis of nonobstructive coronary disease made by CT angiogram February 2019 corresponding score of 38 normal LV systolic function echo today October 2020 mild mitral and aortic insufficiency preserved ejection fraction EKG preoperatively had atrial premature contractions frequent he had lots of atrial arrhythmias with cystoscopy I have him on beta-blocker for that he had his urologic surgery in August without cardiac complications but his EKG now postop shows new anterior T wave inversions  Echo postop no new regional wall motion abnormalities no suspicious symptoms will do pharmacologic nuclear stress test rule out worsening of CAD  It may be a nonspecific finding need to exclude ischemic problem I asked him to follow-up with urology if it is okay to restart his aspirin 81 he still has a ureteral stent in place

## 2020-10-20 ENCOUNTER — TELEPHONE (OUTPATIENT)
Dept: SCHEDULING | Facility: CLINIC | Age: 70
End: 2020-10-20

## 2020-11-04 ENCOUNTER — TELEPHONE (OUTPATIENT)
Dept: CARDIOLOGY | Facility: CLINIC | Age: 70
End: 2020-11-04

## 2020-11-04 ENCOUNTER — HOSPITAL ENCOUNTER (OUTPATIENT)
Dept: CARDIOLOGY | Facility: CLINIC | Age: 70
Setting detail: NUCLEAR MEDICINE
Discharge: HOME | End: 2020-11-04
Attending: INTERNAL MEDICINE
Payer: MEDICARE

## 2020-11-04 ENCOUNTER — APPOINTMENT (OUTPATIENT)
Dept: CARDIOLOGY | Facility: CLINIC | Age: 70
Setting detail: NUCLEAR MEDICINE
End: 2020-11-04
Attending: INTERNAL MEDICINE
Payer: MEDICARE

## 2020-11-04 VITALS
HEART RATE: 88 BPM | HEIGHT: 67 IN | WEIGHT: 140 LBS | SYSTOLIC BLOOD PRESSURE: 114 MMHG | BODY MASS INDEX: 21.97 KG/M2 | DIASTOLIC BLOOD PRESSURE: 64 MMHG

## 2020-11-04 DIAGNOSIS — I25.10 CORONARY ARTERY DISEASE INVOLVING NATIVE CORONARY ARTERY OF NATIVE HEART WITHOUT ANGINA PECTORIS: ICD-10-CM

## 2020-11-04 LAB
CV NM TETROFOSMIN REST DOSE: 10.9 MCI
CV NM TETROFOSMIN STRESS DOSE: 31.9 MCI
STRESS BASELINE BP: NORMAL MMHG
STRESS BASELINE HR: 68 BPM
STRESS ECHO POST RECOVERY HR: 94 BPM
STRESS PERCENT HR: 66 %
STRESS POST PEAK BP: NORMAL MMHG
STRESS POST PEAK HR: 100 BPM
STRESS TARGET HR: 128 BPM

## 2020-11-04 PROCEDURE — 78452 HT MUSCLE IMAGE SPECT MULT: CPT | Mod: MG | Performed by: INTERNAL MEDICINE

## 2020-11-04 PROCEDURE — 93015 CV STRESS TEST SUPVJ I&R: CPT | Performed by: INTERNAL MEDICINE

## 2020-11-04 PROCEDURE — A9502 TC99M TETROFOSMIN: HCPCS | Performed by: INTERNAL MEDICINE

## 2020-11-04 PROCEDURE — G1004 CDSM NDSC: HCPCS | Performed by: INTERNAL MEDICINE

## 2020-11-04 RX ORDER — REGADENOSON 0.08 MG/ML
0.4 INJECTION, SOLUTION INTRAVENOUS ONCE
Status: COMPLETED | OUTPATIENT
Start: 2020-11-04 | End: 2020-11-04

## 2020-11-04 RX ADMIN — REGADENOSON 0.4 MG: 0.08 INJECTION, SOLUTION INTRAVENOUS at 09:00

## 2020-11-04 NOTE — TELEPHONE ENCOUNTER
LMOM his nuclear stress test looked okay. Dr. Mckeon will see pt at his next appt on 11/10/2020. Advised pt to call back with further questions if needed.

## 2020-11-06 ASSESSMENT — ENCOUNTER SYMPTOMS
WHEEZING: 0
HEMATOLOGIC/LYMPHATIC NEGATIVE: 1
HEARTBURN: 0
FOCAL WEAKNESS: 0
FALLS: 0
MYALGIAS: 0
SYNCOPE: 0
VERTIGO: 0
SPUTUM PRODUCTION: 0
HEADACHES: 0
TREMORS: 0
CLAUDICATION: 0
NERVOUS/ANXIOUS: 0
HEMOPTYSIS: 0
ABDOMINAL PAIN: 0
INSOMNIA: 0
SLEEP DISTURBANCES DUE TO BREATHING: 0
WEIGHT LOSS: 0
SHORTNESS OF BREATH: 0
DYSPNEA ON EXERTION: 0
BRUISES/BLEEDS EASILY: 0
HOARSE VOICE: 0
NEAR-SYNCOPE: 0
NUMBNESS: 0
COUGH: 0
LIGHT-HEADEDNESS: 0
PALPITATIONS: 0
DIZZINESS: 0
ORTHOPNEA: 0
DIARRHEA: 0
IRREGULAR HEARTBEAT: 0
SNORING: 0
MEMORY LOSS: 0
MUSCLE CRAMPS: 0
FREQUENCY: 0
NAUSEA: 0
WEIGHT GAIN: 0
CONSTIPATION: 0
CHANGE IN BOWEL HABIT: 0
JAUNDICE: 0
PND: 0
ANOREXIA: 0

## 2020-11-06 NOTE — PROGRESS NOTES
Cardiology Note    Eliot Garcia MD          Jossue Freire is a 70 y.o. male 1950     Returns for cardiac follow-up  He has a diagnosis of nonobstructive CAD made by CT angiogram back in 2019 with a corresponding score of 38  He had bladder cancer surgery involving a diverticulum in August 2020 and had new EKG changes anterior T wave inversions October 2020   I was concerned and we did a repeat a pharmacologic nuclear stress test which came back normal November 2020  Unclear etiology of the new EKG changes  EKG today November, the T waves have normalized  I have no clear explanation why they were abnormal previous tracing     he has hypertension hyperlipidemia bladder cancer with some residual positive tissue the following him closely  Treated for APCs mixed hyperlipidemia  Has no cardiovascular complaints                                         Patient Active Problem List    Diagnosis Date Noted   • Abnormal EKG 11/10/2020   • Bladder cancer (CMS/HCC) 08/14/2020   • Atrial premature contractions 08/14/2020   • Mixed hyperlipidemia 06/07/2019   • Coronary artery disease involving native coronary artery of native heart without angina pectoris 03/01/2019   • Fatty liver 02/12/2019   • Family history of early CAD 02/12/2019   • Essential hypertension 02/11/2019       Allergy  Patient has no known allergies.    MED LIST     Current Outpatient Medications   Medication Sig Dispense Refill   • amLODIPine (NORVASC) 2.5 mg tablet Take 2.5 mg by mouth daily.     • aspirin 81 mg chewable tablet Take 81 mg by mouth daily.     • finasteride (PROSCAR) 5 mg tablet Take 5 mg by mouth once daily.  4   • folic acid/multivit-min/lutein (CENTRUM SILVER ORAL) Take by mouth.     • gabapentin (NEURONTIN) 300 mg capsule Take 300 mg by mouth nightly.     • LORazepam (ATIVAN) 0.5 mg tablet Take 0.5 mg by mouth as needed.    0   • metoprolol succinate XL (TOPROL-XL) 25 mg 24 hr tablet Take 1 tablet (25 mg total) by mouth 2  (two) times a day. 180 tablet 3   • omeprazole (PriLOSEC) 40 mg capsule Take 40 mg by mouth once daily.  3   • rosuvastatin (CRESTOR) 20 mg tablet Take 1 tablet (20 mg total) by mouth daily. 90 tablet 3   • tadalafiL (CIALIS) 5 mg tablet Take 5 mg by mouth daily as needed for erectile dysfunction.     • tamsulosin (FLOMAX) 0.4 mg capsule Take 0.8 mg by mouth nightly.  3   • diazePAM (VALIUM) 2 mg tablet Take 2 mg by mouth nightly.       No current facility-administered medications for this visit.         Review of Systems   Constitution: Negative for malaise/fatigue, weight gain and weight loss.   HENT: Negative for hearing loss and hoarse voice.    Eyes: Negative for visual disturbance.   Cardiovascular: Negative for chest pain, claudication, cyanosis, dyspnea on exertion, irregular heartbeat, leg swelling, near-syncope, orthopnea, palpitations, paroxysmal nocturnal dyspnea and syncope.   Respiratory: Negative for cough, hemoptysis, shortness of breath, sleep disturbances due to breathing, snoring, sputum production and wheezing.    Endocrine: Negative for cold intolerance and heat intolerance.   Hematologic/Lymphatic: Negative.  Negative for bleeding problem. Does not bruise/bleed easily.   Skin: Negative.  Negative for rash.   Musculoskeletal: Negative for arthritis, falls, joint pain, muscle cramps and myalgias.   Gastrointestinal: Negative for abdominal pain, anorexia, change in bowel habit, constipation, diarrhea, dysphagia, heartburn, jaundice and nausea.   Genitourinary: Negative for frequency and nocturia.        Hennessy catheter in place   Neurological: Negative for dizziness, focal weakness, headaches, light-headedness, numbness, tremors and vertigo.   Psychiatric/Behavioral: Negative for memory loss. The patient does not have insomnia and is not nervous/anxious.    Allergic/Immunologic: Negative for hives.                   No results found for: WBC, HGB, HCT, PLT, CHOL, TRIG, HDL, LDLDIRECT, TOTLDLC,  "LDLCALC, ALT, AST, NA, K, CL, CREATININE, BUN, CO2, TSH, INR, HGBA1C, BNP    No results found for: GLUCOSE, CALCIUM, NA, K, CO2, CL, BUN, CREATININE    Objective   Vitals:    11/10/20 1349   BP: 130/80   BP Location: Left upper arm   Patient Position: Sitting   Pulse: 68   SpO2: 99%   Weight: 68 kg (150 lb)   Height: 1.702 m (5' 7\")     Physical Exam   Constitutional: He is oriented to person, place, and time. He appears well-developed and well-nourished. He is active.  Non-toxic appearance. He does not have a sickly appearance. He does not appear ill. No distress. He is not intubated.   HENT:   Head: Normocephalic. Not microcephalic. Head is without raccoon's eyes, without abrasion and without contusion.   Nose: Nose normal.   Eyes: EOM are normal. Left eye exhibits no discharge and no exudate. Right conjunctiva is not injected. Left conjunctiva is not injected. Left conjunctiva has no hemorrhage. No scleral icterus. Pupils are equal.   Neck: Normal range of motion. Neck supple. Normal carotid pulses and no hepatojugular reflux present. Carotid bruit is not present.   Cardiovascular: Normal rate, regular rhythm, normal heart sounds, intact distal pulses and normal pulses. PMI is not displaced. Exam reveals no gallop and no friction rub.   No murmur heard.  Pulmonary/Chest: Effort normal and breath sounds normal. No stridor. No apnea, no tachypnea and no bradypnea. He is not intubated. No respiratory distress. He has no wheezes. He has no rales. He exhibits no tenderness.   Abdominal: Soft. Normal appearance, normal aorta and bowel sounds are normal. He exhibits no distension. There is no abdominal tenderness.   Genitourinary:    Genitourinary Comments: Hennessy catheter in place     Musculoskeletal: Normal range of motion.         General: No deformity or edema.   Neurological: He is alert and oriented to person, place, and time.   Skin: No abrasion, no bruising, no ecchymosis and no rash noted. He is not diaphoretic. " No erythema. No pallor.   Psychiatric: He has a normal mood and affect. His mood appears not anxious. His affect is not angry, not blunt, not labile and not inappropriate. His speech is not slurred. He is not agitated, not aggressive, not withdrawn and not combative. He does not exhibit a depressed mood. He is communicative.       Cardiac Procedures    ECHO  Echo 10/20  Nl ef nild ai mr      STRESS TEST  Stress echo neg 2/19  Pharm nuc neg diaph attenuation  Nov 2020    CAT SCAN   cta feb 2019  COR CTA SCORE 38 LAD [id number]% MID MID RCA 30%   FATTY LIVER     SUMMARY:COR CTA 2/19  1. Nonobstructive two-vessel coronary artery disease. There is focal calcified  plaque involving the proximal and mid LAD with less than 30% stenosis. There is  focal calcified plaque in the mid RCA with less than 30% stenosis.  2.  Normal cardiac structures.  3.  Normal left ventricular systolic function. Left ventricular ejection  fraction 50%.  4.  Small hiatal hernia.  5.  Coronary calcium score 38.  This places the patient in the GREEN 80th risk  percentile for age and gender matched individuals    ELECTROPHYSIOLOGY  HOLTER 9/2020 NSR 72 6% APC      EKG    EKG [facility] August 2020 normal with APCs reviewed    ekg aug 2020 normal        Nov 2020 T waves normalized            October 2020 new anterior T wave inversions                Aug 2020      Assessment/Plan:  Coronary artery disease involving native coronary artery of native heart without angina pectoris  Nonobstructive CAD documented by coronary CT angiogram February 2019 with a corresponding score of 38  Transient anterior T wave inversions noted on EKG in October pharmacologic nuclear stress test done October 2020 - for ischemia EKG November normalized no clear explanation normal LV systolic function echocardiogram October    Atrial premature contractions  Frequent atrial premature contractions first seen at time of cystoscopy trolled with beta-blocker  therapy    Abnormal EKG  Fluctuating anterior T wave inversions noted above    Mixed hyperlipidemia  LDL cholesterols 40 on rosuvastatin    Bladder cancer (CMS/HCC)  August 2020 diverticulum resected Dr. Duran [facility] some residual disease gets cystoscopy every 3 months                       He follows up for nonobstructive CAD documented by coronary CT angiogram in 2019  Atrial premature contractions  While he had transient anterior T wave inversions on his EKG in October I have no idea  But repeat ischemic evaluation done after that EKG was performed was negative for ischemia and EKG today has normalized  Continue aggressive medical therapy with statin antiplatelet follow-up in 6 months  He is followed closely by urology for his bladder cancer with cystoscopy every 3 months I will see him back in 6 months with lab work repeat ischemic evaluation in 2 to 3 years continue beta-blocker for atrial ectopy              This letter was generated using speech recognition software.  Please excuse any typographical errors.    Thank you for allowing me to participate in the care of this patient.  I hope this information is helpful.    Liam Mckeon MD [facility]   11/10/2020  Cc Eliot Garcia MD

## 2020-11-10 ENCOUNTER — OFFICE VISIT (OUTPATIENT)
Dept: CARDIOLOGY | Facility: CLINIC | Age: 70
End: 2020-11-10
Payer: MEDICARE

## 2020-11-10 VITALS
SYSTOLIC BLOOD PRESSURE: 130 MMHG | OXYGEN SATURATION: 99 % | HEART RATE: 68 BPM | BODY MASS INDEX: 23.54 KG/M2 | DIASTOLIC BLOOD PRESSURE: 80 MMHG | HEIGHT: 67 IN | WEIGHT: 150 LBS

## 2020-11-10 DIAGNOSIS — I25.10 CORONARY ARTERY DISEASE INVOLVING NATIVE CORONARY ARTERY OF NATIVE HEART WITHOUT ANGINA PECTORIS: Primary | ICD-10-CM

## 2020-11-10 DIAGNOSIS — I49.1 ATRIAL PREMATURE CONTRACTIONS: ICD-10-CM

## 2020-11-10 DIAGNOSIS — C67.9 MALIGNANT NEOPLASM OF URINARY BLADDER, UNSPECIFIED SITE (CMS/HCC): ICD-10-CM

## 2020-11-10 DIAGNOSIS — R94.31 ABNORMAL EKG: ICD-10-CM

## 2020-11-10 PROCEDURE — 93000 ELECTROCARDIOGRAM COMPLETE: CPT | Performed by: INTERNAL MEDICINE

## 2020-11-10 PROCEDURE — 99214 OFFICE O/P EST MOD 30 MIN: CPT | Performed by: INTERNAL MEDICINE

## 2020-11-10 RX ORDER — NAPROXEN SODIUM 220 MG/1
81 TABLET, FILM COATED ORAL DAILY
COMMUNITY

## 2020-11-10 NOTE — ASSESSMENT & PLAN NOTE
August 2020 diverticulum resected Dr. Duran [facility] [address] some residual disease gets cystoscopy every 3 months

## 2020-11-10 NOTE — LETTER
November 10, 2020     Eliot Garcia MD  [address]  [address]  [place] [address]    Patient: Jossue Freire  YOB: 1950  Date of Visit: 11/10/2020      Dear Ricardo    Thank you for referring Jossue Freire to me for evaluation. Below are my notes for this consultation.    If you have questions, please do not hesitate to call me. I look forward to following your patient along with you.         Sincerely,        Liam Mckeon MD        CC: MD Mike Pope, Liam MONCADA MD  11/10/2020  2:10 PM  Sign when Signing Visit      Cardiology Note    Eliot Garcia MD          Jossue Freire is a 70 y.o. male 1950     Returns for cardiac follow-up  He has a diagnosis of nonobstructive CAD made by CT angiogram back in 2019 with a corresponding score of 38  He had bladder cancer surgery involving a diverticulum in August 2020 and had new EKG changes anterior T wave inversions October 2020   I was concerned and we did a repeat a pharmacologic nuclear stress test which came back normal November 2020  Unclear etiology of the new EKG changes  EKG today November, the T waves have normalized  I have no clear explanation why they were abnormal previous tracing     he has hypertension hyperlipidemia bladder cancer with some residual positive tissue the following him closely  Treated for APCs mixed hyperlipidemia  Has no cardiovascular complaints                                         Patient Active Problem List    Diagnosis Date Noted   • Abnormal EKG 11/10/2020   • Bladder cancer (CMS/HCC) 08/14/2020   • Atrial premature contractions 08/14/2020   • Mixed hyperlipidemia 06/07/2019   • Coronary artery disease involving native coronary artery of native heart without angina pectoris 03/01/2019   • Fatty liver 02/12/2019   • Family history of early CAD 02/12/2019   • Essential hypertension 02/11/2019       Allergy  Patient has no known allergies.    MED LIST     Current Outpatient  Medications   Medication Sig Dispense Refill   • amLODIPine (NORVASC) 2.5 mg tablet Take 2.5 mg by mouth daily.     • aspirin 81 mg chewable tablet Take 81 mg by mouth daily.     • finasteride (PROSCAR) 5 mg tablet Take 5 mg by mouth once daily.  4   • folic acid/multivit-min/lutein (CENTRUM SILVER ORAL) Take by mouth.     • gabapentin (NEURONTIN) 300 mg capsule Take 300 mg by mouth nightly.     • LORazepam (ATIVAN) 0.5 mg tablet Take 0.5 mg by mouth as needed.    0   • metoprolol succinate XL (TOPROL-XL) 25 mg 24 hr tablet Take 1 tablet (25 mg total) by mouth 2 (two) times a day. 180 tablet 3   • omeprazole (PriLOSEC) 40 mg capsule Take 40 mg by mouth once daily.  3   • rosuvastatin (CRESTOR) 20 mg tablet Take 1 tablet (20 mg total) by mouth daily. 90 tablet 3   • tadalafiL (CIALIS) 5 mg tablet Take 5 mg by mouth daily as needed for erectile dysfunction.     • tamsulosin (FLOMAX) 0.4 mg capsule Take 0.8 mg by mouth nightly.  3   • diazePAM (VALIUM) 2 mg tablet Take 2 mg by mouth nightly.       No current facility-administered medications for this visit.         Review of Systems   Constitution: Negative for malaise/fatigue, weight gain and weight loss.   HENT: Negative for hearing loss and hoarse voice.    Eyes: Negative for visual disturbance.   Cardiovascular: Negative for chest pain, claudication, cyanosis, dyspnea on exertion, irregular heartbeat, leg swelling, near-syncope, orthopnea, palpitations, paroxysmal nocturnal dyspnea and syncope.   Respiratory: Negative for cough, hemoptysis, shortness of breath, sleep disturbances due to breathing, snoring, sputum production and wheezing.    Endocrine: Negative for cold intolerance and heat intolerance.   Hematologic/Lymphatic: Negative.  Negative for bleeding problem. Does not bruise/bleed easily.   Skin: Negative.  Negative for rash.   Musculoskeletal: Negative for arthritis, falls, joint pain, muscle cramps and myalgias.   Gastrointestinal: Negative for abdominal  "pain, anorexia, change in bowel habit, constipation, diarrhea, dysphagia, heartburn, jaundice and nausea.   Genitourinary: Negative for frequency and nocturia.        Hennessy catheter in place   Neurological: Negative for dizziness, focal weakness, headaches, light-headedness, numbness, tremors and vertigo.   Psychiatric/Behavioral: Negative for memory loss. The patient does not have insomnia and is not nervous/anxious.    Allergic/Immunologic: Negative for hives.                   No results found for: WBC, HGB, HCT, PLT, CHOL, TRIG, HDL, LDLDIRECT, TOTLDLC, LDLCALC, ALT, AST, NA, K, CL, CREATININE, BUN, CO2, TSH, INR, HGBA1C, BNP    No results found for: GLUCOSE, CALCIUM, NA, K, CO2, CL, BUN, CREATININE    Objective   Vitals:    11/10/20 1349   BP: 130/80   BP Location: Left upper arm   Patient Position: Sitting   Pulse: 68   SpO2: 99%   Weight: 68 kg (150 lb)   Height: 1.702 m (5' 7\")     Physical Exam   Constitutional: He is oriented to person, place, and time. He appears well-developed and well-nourished. He is active.  Non-toxic appearance. He does not have a sickly appearance. He does not appear ill. No distress. He is not intubated.   HENT:   Head: Normocephalic. Not microcephalic. Head is without raccoon's eyes, without abrasion and without contusion.   Nose: Nose normal.   Eyes: EOM are normal. Left eye exhibits no discharge and no exudate. Right conjunctiva is not injected. Left conjunctiva is not injected. Left conjunctiva has no hemorrhage. No scleral icterus. Pupils are equal.   Neck: Normal range of motion. Neck supple. Normal carotid pulses and no hepatojugular reflux present. Carotid bruit is not present.   Cardiovascular: Normal rate, regular rhythm, normal heart sounds, intact distal pulses and normal pulses. PMI is not displaced. Exam reveals no gallop and no friction rub.   No murmur heard.  Pulmonary/Chest: Effort normal and breath sounds normal. No stridor. No apnea, no tachypnea and no " bradypnea. He is not intubated. No respiratory distress. He has no wheezes. He has no rales. He exhibits no tenderness.   Abdominal: Soft. Normal appearance, normal aorta and bowel sounds are normal. He exhibits no distension. There is no abdominal tenderness.   Genitourinary:    Genitourinary Comments: Hennessy catheter in place     Musculoskeletal: Normal range of motion.         General: No deformity or edema.   Neurological: He is alert and oriented to person, place, and time.   Skin: No abrasion, no bruising, no ecchymosis and no rash noted. He is not diaphoretic. No erythema. No pallor.   Psychiatric: He has a normal mood and affect. His mood appears not anxious. His affect is not angry, not blunt, not labile and not inappropriate. His speech is not slurred. He is not agitated, not aggressive, not withdrawn and not combative. He does not exhibit a depressed mood. He is communicative.       Cardiac Procedures    ECHO  Echo 10/20  Nl ef nild ai mr      STRESS TEST  Stress echo neg 2/19  Pharm nuc neg diaph attenuation  Nov 2020    CAT SCAN   cta feb 2019  COR CTA SCORE 38 LAD [id number]% MID MID RCA 30%   FATTY LIVER     SUMMARY:COR CTA 2/19  1. Nonobstructive two-vessel coronary artery disease. There is focal calcified  plaque involving the proximal and mid LAD with less than 30% stenosis. There is  focal calcified plaque in the mid RCA with less than 30% stenosis.  2.  Normal cardiac structures.  3.  Normal left ventricular systolic function. Left ventricular ejection  fraction 50%.  4.  Small hiatal hernia.  5.  Coronary calcium score 38.  This places the patient in the GREEN 80th risk  percentile for age and gender matched individuals    ELECTROPHYSIOLOGY  HOLTER 9/2020 NSR 72 6% APC      EKG    EKG [facility] August 2020 normal with APCs reviewed    ekg aug 2020 normal        Nov 2020 T waves normalized            October 2020 new anterior T wave inversions                Aug  2020      Assessment/Plan:  Coronary artery disease involving native coronary artery of native heart without angina pectoris  Nonobstructive CAD documented by coronary CT angiogram February 2019 with a corresponding score of 38  Transient anterior T wave inversions noted on EKG in October pharmacologic nuclear stress test done October 2020 - for ischemia EKG November normalized no clear explanation normal LV systolic function echocardiogram October    Atrial premature contractions  Frequent atrial premature contractions first seen at time of cystoscopy trolled with beta-blocker therapy    Abnormal EKG  Fluctuating anterior T wave inversions noted above    Mixed hyperlipidemia  LDL cholesterols 40 on rosuvastatin    Bladder cancer (CMS/HCC)  August 2020 diverticulum resected Dr. Duran [facility] some residual disease gets cystoscopy every 3 months                       He follows up for nonobstructive CAD documented by coronary CT angiogram in 2019  Atrial premature contractions  While he had transient anterior T wave inversions on his EKG in October I have no idea  But repeat ischemic evaluation done after that EKG was performed was negative for ischemia and EKG today has normalized  Continue aggressive medical therapy with statin antiplatelet follow-up in 6 months  He is followed closely by urology for his bladder cancer with cystoscopy every 3 months I will see him back in 6 months with lab work repeat ischemic evaluation in 2 to 3 years continue beta-blocker for atrial ectopy              This letter was generated using speech recognition software.  Please excuse any typographical errors.    Thank you for allowing me to participate in the care of this patient.  I hope this information is helpful.    Liam Mckeon MD [facility]   11/10/2020  Cc Eliot Garcia MD

## 2020-11-10 NOTE — ASSESSMENT & PLAN NOTE
Nonobstructive CAD documented by coronary CT angiogram February 2019 with a corresponding score of 38  Transient anterior T wave inversions noted on EKG in October pharmacologic nuclear stress test done October 2020 - for ischemia EKG November normalized no clear explanation normal LV systolic function echocardiogram October

## 2020-11-10 NOTE — ASSESSMENT & PLAN NOTE
Frequent atrial premature contractions first seen at time of cystoscopy trolled with beta-blocker therapy

## 2021-02-17 ENCOUNTER — APPOINTMENT (RX ONLY)
Dept: URBAN - METROPOLITAN AREA CLINIC 374 | Facility: CLINIC | Age: 71
Setting detail: DERMATOLOGY
End: 2021-02-17

## 2021-02-17 DIAGNOSIS — D18.0 HEMANGIOMA: ICD-10-CM

## 2021-02-17 DIAGNOSIS — Z71.89 OTHER SPECIFIED COUNSELING: ICD-10-CM

## 2021-02-17 DIAGNOSIS — L57.0 ACTINIC KERATOSIS: ICD-10-CM

## 2021-02-17 DIAGNOSIS — L82.1 OTHER SEBORRHEIC KERATOSIS: ICD-10-CM

## 2021-02-17 DIAGNOSIS — D22 MELANOCYTIC NEVI: ICD-10-CM

## 2021-02-17 DIAGNOSIS — L81.4 OTHER MELANIN HYPERPIGMENTATION: ICD-10-CM

## 2021-02-17 DIAGNOSIS — L21.8 OTHER SEBORRHEIC DERMATITIS: ICD-10-CM

## 2021-02-17 PROBLEM — D18.01 HEMANGIOMA OF SKIN AND SUBCUTANEOUS TISSUE: Status: ACTIVE | Noted: 2021-02-17

## 2021-02-17 PROBLEM — D22.9 MELANOCYTIC NEVI, UNSPECIFIED: Status: ACTIVE | Noted: 2021-02-17

## 2021-02-17 PROCEDURE — ? FULL BODY SKIN EXAM

## 2021-02-17 PROCEDURE — 99203 OFFICE O/P NEW LOW 30 MIN: CPT | Mod: 25

## 2021-02-17 PROCEDURE — ? SUNSCREEN RECOMMENDATIONS

## 2021-02-17 PROCEDURE — ? PREMALIGNANT DESTRUCTION

## 2021-02-17 PROCEDURE — ? COUNSELING

## 2021-02-17 PROCEDURE — 17000 DESTRUCT PREMALG LESION: CPT

## 2021-02-17 PROCEDURE — ? PRESCRIPTION

## 2021-02-17 PROCEDURE — 17003 DESTRUCT PREMALG LES 2-14: CPT

## 2021-02-17 PROCEDURE — ? PRESCRIPTION MEDICATION MANAGEMENT

## 2021-02-17 RX ORDER — HYDROCORTISONE 25 MG/G
CREAM TOPICAL BID
Qty: 1 | Refills: 3 | Status: ERX | COMMUNITY
Start: 2021-02-17

## 2021-02-17 RX ADMIN — HYDROCORTISONE: 25 CREAM TOPICAL at 00:00

## 2021-02-17 ASSESSMENT — LOCATION DETAILED DESCRIPTION DERM
LOCATION DETAILED: RIGHT MID TEMPLE
LOCATION DETAILED: LEFT MEDIAL FRONTAL SCALP
LOCATION DETAILED: RIGHT DISTAL POSTERIOR UPPER ARM
LOCATION DETAILED: RIGHT INFERIOR FOREHEAD
LOCATION DETAILED: RIGHT CENTRAL MALAR CHEEK
LOCATION DETAILED: RIGHT SUPERIOR PARIETAL SCALP
LOCATION DETAILED: LEFT CENTRAL MALAR CHEEK
LOCATION DETAILED: RIGHT CENTRAL PARIETAL SCALP
LOCATION DETAILED: LEFT SUPERIOR PARIETAL SCALP
LOCATION DETAILED: RIGHT LATERAL EYEBROW
LOCATION DETAILED: LEFT DISTAL POSTERIOR UPPER ARM
LOCATION DETAILED: RIGHT MEDIAL SUPERIOR CHEST
LOCATION DETAILED: SUPERIOR THORACIC SPINE

## 2021-02-17 ASSESSMENT — LOCATION SIMPLE DESCRIPTION DERM
LOCATION SIMPLE: RIGHT CHEEK
LOCATION SIMPLE: SCALP
LOCATION SIMPLE: LEFT SCALP
LOCATION SIMPLE: RIGHT EYEBROW
LOCATION SIMPLE: LEFT CHEEK
LOCATION SIMPLE: RIGHT UPPER ARM
LOCATION SIMPLE: LEFT UPPER ARM
LOCATION SIMPLE: CHEST
LOCATION SIMPLE: RIGHT FOREHEAD
LOCATION SIMPLE: UPPER BACK
LOCATION SIMPLE: RIGHT TEMPLE

## 2021-02-17 ASSESSMENT — LOCATION ZONE DERM
LOCATION ZONE: ARM
LOCATION ZONE: TRUNK
LOCATION ZONE: SCALP
LOCATION ZONE: FACE

## 2021-02-17 NOTE — PROCEDURE: PRESCRIPTION MEDICATION MANAGEMENT
Render In Strict Bullet Format?: No
Detail Level: Zone
Initiate Treatment: Hydrocortisone 2.5% cream apply BID face rash

## 2021-02-17 NOTE — PROCEDURE: PREMALIGNANT DESTRUCTION
Render Post-Care In The Note: No
Post-Care Instructions: I reviewed with the patient in detail post-care instructions. Patient is to wear sunprotection, and avoid picking at any of the treated lesions. Pt may apply Vaseline to crusted or scabbing areas.
Anesthesia Volume In Cc: 0
Detail Level: Detailed
Method: liquid nitrogen
Consent: The patient's consent was obtained including but not limited to risks of crusting, scabbing, blistering, scarring, darker or lighter pigmentary change, recurrence, incomplete removal and infection.

## 2021-02-17 NOTE — PROCEDURE: SUNSCREEN RECOMMENDATIONS

## 2021-04-28 ASSESSMENT — ENCOUNTER SYMPTOMS
MEMORY LOSS: 0
DIARRHEA: 0
CHANGE IN BOWEL HABIT: 0
HEADACHES: 0
SLEEP DISTURBANCES DUE TO BREATHING: 0
SHORTNESS OF BREATH: 0
SPUTUM PRODUCTION: 0
INSOMNIA: 0
IRREGULAR HEARTBEAT: 0
NEAR-SYNCOPE: 0
DYSPNEA ON EXERTION: 0
HEARTBURN: 0
NUMBNESS: 0
PND: 0
MUSCLE CRAMPS: 0
FREQUENCY: 0
HOARSE VOICE: 0
HEMOPTYSIS: 0
COUGH: 0
CLAUDICATION: 0
WHEEZING: 0
JAUNDICE: 0
PALPITATIONS: 0
DIZZINESS: 0
ANOREXIA: 0
WEIGHT GAIN: 0
FOCAL WEAKNESS: 0
HEMATOLOGIC/LYMPHATIC NEGATIVE: 1
NAUSEA: 0
SYNCOPE: 0
CONSTIPATION: 0
FALLS: 0
MYALGIAS: 0
VERTIGO: 0
SNORING: 0
ABDOMINAL PAIN: 0
TREMORS: 0
WEIGHT LOSS: 0
NERVOUS/ANXIOUS: 0
ORTHOPNEA: 0
LIGHT-HEADEDNESS: 0
BRUISES/BLEEDS EASILY: 0

## 2021-04-28 NOTE — PROGRESS NOTES
Cardiology Note    Karis Bradley MD          Jossue Freire is a 70 y.o. male 1950       He returns for follow-up and review his lab work  Diagnosis of nonobstructive CAD made by coronary CT angiogram February 2019 with a corresponding coronary calcium score of 38  He had transient T wave inversion seen on EKG he had October  Normal LV systolic function with echocardiogram at that time  Frequent APCs controlled with beta-blocker therapy mixed hyperlipidemia  Bladder cancer diverticulum resected August 2020 [facility]  He just completed BCG treatment in May because of positive cytology  Denies any active bleeding but he has no anemia      Lab work reviewed May 2021 cholesterol at goal remarkable for anemia hemoglobin 10.1                                                                       Patient Active Problem List    Diagnosis Date Noted   • Anemia 05/11/2021   • Abnormal EKG 11/10/2020   • Bladder cancer (CMS/HCC) 08/14/2020   • Atrial premature contractions 08/14/2020   • Mixed hyperlipidemia 06/07/2019   • Coronary artery disease involving native coronary artery of native heart without angina pectoris 03/01/2019   • Fatty liver 02/12/2019   • Family history of early CAD 02/12/2019   • Essential hypertension 02/11/2019       Allergy  Patient has no known allergies.    MED LIST     Current Outpatient Medications   Medication Sig Dispense Refill   • amLODIPine (NORVASC) 2.5 mg tablet Take 2.5 mg by mouth daily.     • aspirin 81 mg chewable tablet Take 81 mg by mouth daily.     • finasteride (PROSCAR) 5 mg tablet Take 5 mg by mouth once daily.  4   • folic acid/multivit-min/lutein (CENTRUM SILVER ORAL) Take by mouth.     • gabapentin (NEURONTIN) 300 mg capsule Take 300 mg by mouth nightly.     • LORazepam (ATIVAN) 0.5 mg tablet Take 0.5 mg by mouth as needed.    0   • metoprolol succinate XL (TOPROL-XL) 25 mg 24 hr tablet Take 1 tablet (25 mg total) by mouth 2 (two) times a day. 180  tablet 3   • omeprazole (PriLOSEC) 40 mg capsule Take 40 mg by mouth once daily.  3   • rosuvastatin (CRESTOR) 20 mg tablet Take 1 tablet (20 mg total) by mouth daily. 90 tablet 3   • tadalafiL (CIALIS) 5 mg tablet Take 5 mg by mouth daily as needed for erectile dysfunction.     • tamsulosin (FLOMAX) 0.4 mg capsule Take 0.8 mg by mouth nightly.  3     No current facility-administered medications for this visit.        Review of Systems   Constitutional: Negative for malaise/fatigue, weight gain and weight loss.   HENT: Negative for hearing loss and hoarse voice.    Eyes: Negative for visual disturbance.   Cardiovascular: Negative for chest pain, claudication, cyanosis, dyspnea on exertion, irregular heartbeat, leg swelling, near-syncope, orthopnea, palpitations, paroxysmal nocturnal dyspnea and syncope.   Respiratory: Negative for cough, hemoptysis, shortness of breath, sleep disturbances due to breathing, snoring, sputum production and wheezing.    Endocrine: Negative for cold intolerance and heat intolerance.   Hematologic/Lymphatic: Negative.  Negative for bleeding problem. Does not bruise/bleed easily.   Skin: Negative.  Negative for rash.   Musculoskeletal: Negative for arthritis, falls, joint pain, muscle cramps and myalgias.   Gastrointestinal: Negative for abdominal pain, anorexia, change in bowel habit, constipation, diarrhea, dysphagia, heartburn, jaundice and nausea.   Genitourinary: Negative for frequency and nocturia.        Hennessy catheter in place   Neurological: Negative for dizziness, focal weakness, headaches, light-headedness, numbness, tremors and vertigo.   Psychiatric/Behavioral: Negative for memory loss. The patient does not have insomnia and is not nervous/anxious.    Allergic/Immunologic: Negative for hives.                   No results found for: WBC, HGB, HCT, PLT, CHOL, TRIG, HDL, LDLDIRECT, TOTLDLC, LDLCALC, ALT, AST, NA, K, CL, CREATININE, BUN, CO2, TSH, INR, HGBA1C, BNP    No results  "found for: GLUCOSE, CALCIUM, NA, K, CO2, CL, BUN, CREATININE    Objective   Vitals:    05/11/21 0750 05/11/21 0804   BP: (!) 152/76 140/80   BP Location: Left upper arm    Patient Position: Sitting    Pulse: 70    Resp: 16    SpO2: 98%    Weight: 67.6 kg (149 lb)    Height: 1.702 m (5' 7\")      Physical Exam  Constitutional:       General: He is not in acute distress.     Appearance: Normal appearance. He is well-developed. He is not ill-appearing, toxic-appearing or diaphoretic.      Interventions: He is not intubated.  HENT:      Head: Normocephalic. Not microcephalic. No raccoon eyes, abrasion or contusion.      Nose: Nose normal.   Eyes:      General: No scleral icterus.        Left eye: No discharge.      Conjunctiva/sclera:      Right eye: Right conjunctiva is not injected.      Left eye: Left conjunctiva is not injected. No exudate or hemorrhage.     Pupils: Pupils are equal.   Neck:      Vascular: Normal carotid pulses. No carotid bruit or hepatojugular reflux.   Cardiovascular:      Rate and Rhythm: Normal rate and regular rhythm.      Chest Wall: PMI is not displaced.      Pulses: Normal pulses and intact distal pulses.      Heart sounds: Normal heart sounds. No murmur heard.   No friction rub. No gallop.    Pulmonary:      Effort: Pulmonary effort is normal. No tachypnea, bradypnea or respiratory distress. He is not intubated.      Breath sounds: Normal breath sounds. No stridor. No wheezing or rales.   Chest:      Chest wall: No tenderness.   Abdominal:      General: Bowel sounds are normal. There is no distension.      Palpations: Abdomen is soft.      Tenderness: There is no abdominal tenderness.   Genitourinary:     Comments: Hennessy catheter in place  Musculoskeletal:         General: No deformity. Normal range of motion.      Cervical back: Normal range of motion and neck supple.   Skin:     Coloration: Skin is not pale.      Findings: No abrasion, bruising, ecchymosis, erythema or rash. "   Neurological:      Mental Status: He is alert and oriented to person, place, and time.   Psychiatric:         Mood and Affect: Mood is not anxious or depressed. Affect is not labile, blunt, angry or inappropriate.         Speech: He is communicative. Speech is not slurred.         Behavior: Behavior is not agitated, aggressive, withdrawn or combative.         Cardiac Procedures    ECHO  Echo 10/20  Nl ef nild ai mr      STRESS TEST  Stress echo neg 2/19  Pharm nuc neg diaph attenuation  Nov 2020    CAT SCAN   cta feb 2019  COR CTA SCORE 38 LAD PROX LAD30% MID MID RCA 30%   FATTY LIVER     SUMMARY:COR CTA 2/19  1. Nonobstructive two-vessel coronary artery disease. There is focal calcified  plaque involving the proximal and mid LAD with less than 30% stenosis. There is  focal calcified plaque in the mid RCA with less than 30% stenosis.  2.  Normal cardiac structures.  3.  Normal left ventricular systolic function. Left ventricular ejection  fraction 50%.  4.  Small hiatal hernia.  5.  Coronary calcium score 38.  This places the patient in the GREEN 80th risk  percentile for age and gender matched individuals    ELECTROPHYSIOLOGY  HOLTER 9/2020 NSR 72 6% APC      EKG    EKG [facility] August 2020 normal with APCs reviewed    ekg aug 2020 normal    MAY 2021    Nov 2020 T waves normalized            October 2020 new anterior T wave inversions                Aug 2020      Assessment/Plan:  Coronary artery disease involving native coronary artery of native heart without angina pectoris  He has nonobstructive CAD diagnosed by coronary CT angiogram performed February 2019 he has history of transient T wave inversions and frequent APCs noted during cystoscopy in the past last pharmacologic nuclear stress test negative for ischemia November 2020 normal LV systolic function with echocardiogram October 2020 on statin beta-blocker calcium channel blocker 1 elevated blood pressure reading today he will follow if  you find is elevated will increase amlodipine to 5 mg a day no changes today    Atrial premature contractions  He had frequent APCs at time of cystoscopy in the past note clinical issues controlled with beta-blocker coronary anatomy as above    Bladder cancer (CMS/HCC)  Follows with Dr. Duran [facility] recent BCG therapy May 2021    Mixed hyperlipidemia  LDL cholesterol at goal on rosuvastatin    Anemia  10.5 no clinical bleeding noted.  Recheck iron levels he is following up with you in a few weeks    Abnormal EKG  History of transient anterior T wave inversions and frequent APCs normal today nonobstructive CAD coronary CT angiogram February 2019 corresponding score 38 last ischemic evaluation pharmacologic nuclear stress test - November 2020 normal echocardiogram with trace regurgitant lesions October 2020                       He follows up for nonobstructive CAD documented by coronary CT angiogram in 2019  Atrial premature contractions  While he had transient anterior T wave inversions on his EKG in October I have no idea  But repeat ischemic evaluation done after that EKG was performed was negative for ischemia and EKG today has normalized  Continue aggressive medical therapy with statin antiplatelet follow-up in 6 months  He is followed closely by urology for his bladder cancer with cystoscopy every 3 months he just completed BCG therapy  Lab work remarkable for new anemia repeat lab work and iron levels he will follow-up with you in the next few weeks  I will see him in 6 months with lab work due for echocardiogram and repeat pharmacologic nuclear stress test for stress echocardiogram, ischemic evaluation November 2022  Blood pressure elevated when he first came in if you find it is elevated would increase amlodipine to 5 mg I made no changes today              This letter was generated using speech recognition software.  Please excuse any typographical errors.    Thank you for allowing me to  participate in the care of this patient.  I hope this information is helpful.    Liam Mckeon MD [facility]   5/11/2021  Karis Harris MD

## 2021-05-11 ENCOUNTER — OFFICE VISIT (OUTPATIENT)
Dept: CARDIOLOGY | Facility: CLINIC | Age: 71
End: 2021-05-11
Payer: MEDICARE

## 2021-05-11 VITALS
OXYGEN SATURATION: 98 % | RESPIRATION RATE: 16 BRPM | HEART RATE: 70 BPM | WEIGHT: 149 LBS | HEIGHT: 67 IN | BODY MASS INDEX: 23.39 KG/M2 | DIASTOLIC BLOOD PRESSURE: 80 MMHG | SYSTOLIC BLOOD PRESSURE: 140 MMHG

## 2021-05-11 DIAGNOSIS — C67.9 MALIGNANT NEOPLASM OF URINARY BLADDER, UNSPECIFIED SITE (CMS/HCC): ICD-10-CM

## 2021-05-11 DIAGNOSIS — I25.10 CORONARY ARTERY DISEASE INVOLVING NATIVE CORONARY ARTERY OF NATIVE HEART WITHOUT ANGINA PECTORIS: Primary | ICD-10-CM

## 2021-05-11 DIAGNOSIS — D64.9 ANEMIA, UNSPECIFIED TYPE: ICD-10-CM

## 2021-05-11 DIAGNOSIS — E78.2 MIXED HYPERLIPIDEMIA: ICD-10-CM

## 2021-05-11 DIAGNOSIS — I49.1 ATRIAL PREMATURE CONTRACTIONS: ICD-10-CM

## 2021-05-11 PROCEDURE — G8753 SYS BP > OR = 140: HCPCS | Performed by: INTERNAL MEDICINE

## 2021-05-11 PROCEDURE — 93000 ELECTROCARDIOGRAM COMPLETE: CPT | Performed by: INTERNAL MEDICINE

## 2021-05-11 PROCEDURE — G8754 DIAS BP LESS 90: HCPCS | Performed by: INTERNAL MEDICINE

## 2021-05-11 PROCEDURE — 99214 OFFICE O/P EST MOD 30 MIN: CPT | Performed by: INTERNAL MEDICINE

## 2021-05-11 ASSESSMENT — PAIN SCALES - GENERAL: PAINLEVEL: 0-NO PAIN

## 2021-05-11 NOTE — PATIENT INSTRUCTIONS
Repeat cbc and iron levels see [facility] doc few weeks  See me 6 months full labs  If bp elevated let me know will increase amlodipine to 5 mg

## 2021-05-11 NOTE — ASSESSMENT & PLAN NOTE
He has nonobstructive CAD diagnosed by coronary CT angiogram performed February 2019 he has history of transient T wave inversions and frequent APCs noted during cystoscopy in the past last pharmacologic nuclear stress test negative for ischemia November 2020 normal LV systolic function with echocardiogram October 2020 on statin beta-blocker calcium channel blocker 1 elevated blood pressure reading today he will follow if you find is elevated will increase amlodipine to 5 mg a day no changes today

## 2021-05-11 NOTE — ASSESSMENT & PLAN NOTE
History of transient anterior T wave inversions and frequent APCs normal today nonobstructive CAD coronary CT angiogram February 2019 corresponding score 38 last ischemic evaluation pharmacologic nuclear stress test - November 2020 normal echocardiogram with trace regurgitant lesions October 2020

## 2021-05-11 NOTE — ASSESSMENT & PLAN NOTE
He had frequent APCs at time of cystoscopy in the past note clinical issues controlled with beta-blocker coronary anatomy as above

## 2021-10-07 RX ORDER — METOPROLOL SUCCINATE 25 MG/1
TABLET, EXTENDED RELEASE ORAL
Qty: 90 TABLET | Refills: 3 | Status: SHIPPED | OUTPATIENT
Start: 2021-10-07

## 2024-11-14 NOTE — TELEPHONE ENCOUNTER
LMOM for pt to call back and reschedule pharm nuc med test at [facility] and  appts. Pt will need to self quarantine for two weeks before being scheduled for these appts.  
Lynette-please see below message !!!!  THX   
Pt called to Cancel his Nuc Med Tests for tomorrow, 10/21/2020 @ [facility] Location and his OV with Dr. Mckeon for this Friday.   Pt states he has a cold and wants to push the appts back.   Pt is going out of town to [place] on Sunday and returning Next Thursday.   Pt would like to Re-schedule Nuc Med Test and OV with Dr. Mckeon for when he returns if possible.     Pt can be reached @ [phone].     TY         
Pt calling in regards to previous messages.     Pt requesting c/b from [facility]  Pt can be reached at [phone]  
Spoke to pt.  He is rescheduled to 11/4/2020 for the pharm nuc med study at [facility] and 11/10/2020 for the ov appt with Dr. Mckeon.  
58.1